# Patient Record
Sex: MALE | Race: OTHER | HISPANIC OR LATINO
[De-identification: names, ages, dates, MRNs, and addresses within clinical notes are randomized per-mention and may not be internally consistent; named-entity substitution may affect disease eponyms.]

---

## 2022-11-15 ENCOUNTER — APPOINTMENT (OUTPATIENT)
Dept: VASCULAR SURGERY | Facility: CLINIC | Age: 62
End: 2022-11-15

## 2022-12-14 ENCOUNTER — TRANSCRIPTION ENCOUNTER (OUTPATIENT)
Age: 62
End: 2022-12-14

## 2022-12-14 ENCOUNTER — APPOINTMENT (OUTPATIENT)
Dept: CARDIOLOGY | Facility: CLINIC | Age: 62
End: 2022-12-14

## 2022-12-14 VITALS
OXYGEN SATURATION: 97 % | SYSTOLIC BLOOD PRESSURE: 155 MMHG | DIASTOLIC BLOOD PRESSURE: 65 MMHG | WEIGHT: 230 LBS | HEART RATE: 99 BPM | HEIGHT: 69 IN | BODY MASS INDEX: 34.07 KG/M2

## 2022-12-14 DIAGNOSIS — Z82.49 FAMILY HISTORY OF ISCHEMIC HEART DISEASE AND OTHER DISEASES OF THE CIRCULATORY SYSTEM: ICD-10-CM

## 2022-12-14 DIAGNOSIS — Z81.8 FAMILY HISTORY OF OTHER MENTAL AND BEHAVIORAL DISORDERS: ICD-10-CM

## 2022-12-14 DIAGNOSIS — Z83.3 FAMILY HISTORY OF DIABETES MELLITUS: ICD-10-CM

## 2022-12-14 DIAGNOSIS — M19.90 UNSPECIFIED OSTEOARTHRITIS, UNSPECIFIED SITE: ICD-10-CM

## 2022-12-14 DIAGNOSIS — Z78.9 OTHER SPECIFIED HEALTH STATUS: ICD-10-CM

## 2022-12-14 PROCEDURE — 99204 OFFICE O/P NEW MOD 45 MIN: CPT

## 2022-12-14 RX ORDER — LOSARTAN POTASSIUM 100 MG/1
100 TABLET, FILM COATED ORAL DAILY
Qty: 90 | Refills: 3 | Status: ACTIVE | COMMUNITY

## 2022-12-14 RX ORDER — METFORMIN HYDROCHLORIDE 500 MG/1
500 TABLET, COATED ORAL
Refills: 0 | Status: ACTIVE | COMMUNITY

## 2022-12-14 NOTE — REASON FOR VISIT
[Consultation] : a consultation regarding [Peripheral Vascular Disease] : peripheral vascular disease [FreeTextEntry1] : 62 year old male with past medical history of HTN, HL, DM, arthritis, s/p multiple surgical procedure involving the right hip who presents for vascular medicine evaluation. \par \par He has had swelling in his legs since his hip surgeries (Right) and knee replacement (Left).  The right leg the swelling is localized to where his calf brace that he wears for foot drop). The left leg has swelling to the knee with skin color changes. No open wounds. He has limited mobility because of the foot drop, complains of some ramping/pain with walking. He had a recent stress test/angiography that he said was normal. He had his kidney checked out recently with no problems. \par \par The swelling does not involve the feet. The swelling is better with elevation and after being off his feet for a while. He does not currently use compression stockings.

## 2022-12-14 NOTE — PHYSICAL EXAM
[General Appearance - Well Developed] : well developed [Normal Appearance] : normal appearance [General Appearance - Well Nourished] : well nourished [Normal Conjunctiva] : the conjunctiva exhibited no abnormalities [Normal Oral Mucosa] : normal oral mucosa [Normal Oropharynx] : normal oropharynx [Normal Jugular Venous A Waves Present] : normal jugular venous A waves present [Normal Jugular Venous V Waves Present] : normal jugular venous V waves present [Heart Rate And Rhythm] : heart rate and rhythm were normal [Heart Sounds] : normal S1 and S2 [Murmurs] : no murmurs present [Respiration, Rhythm And Depth] : normal respiratory rhythm and effort [Auscultation Breath Sounds / Voice Sounds] : lungs were clear to auscultation bilaterally [Cyanosis, Localized] : no localized cyanosis [] : no ischemic changes [FreeTextEntry1] : hyperpigmentation of the left calf, thickened skin, all consistent with long standing edema  [Oriented To Time, Place, And Person] : oriented to person, place, and time [Impaired Insight] : insight and judgment were intact [Affect] : the affect was normal

## 2022-12-14 NOTE — REVIEW OF SYSTEMS
[Lower Ext Edema] : lower extremity edema [Joint Stiffness] : joint stiffness [Change In Color Of Skin] : change in skin color [Numbness (Hypoesthesia)] : numbness [Tingling (Paresthesia)] : tingling [Limb Weakness (Paresis)] : limb weakness (Paresis) [Negative] : Heme/Lymph

## 2022-12-14 NOTE — ASSESSMENT
[FreeTextEntry1] : #leg swelling: Multifactorial, likely venous insufficiency and lymphedema given multiple prior orthopedic interventions. \par \par -venous duplex with reflux studies, rule out DVT\par -trial of compression stockings\par \par #leg pain/cramps: \par -arterial duplex, ADIS given RFs and some pain with walking\par \par Follow-up once vascular testing is completed

## 2022-12-23 ENCOUNTER — OUTPATIENT (OUTPATIENT)
Dept: OUTPATIENT SERVICES | Facility: HOSPITAL | Age: 62
LOS: 1 days | End: 2022-12-23
Payer: MEDICARE

## 2022-12-23 ENCOUNTER — APPOINTMENT (OUTPATIENT)
Dept: ULTRASOUND IMAGING | Facility: CLINIC | Age: 62
End: 2022-12-23

## 2022-12-23 DIAGNOSIS — T84.010A BROKEN INTERNAL RIGHT HIP PROSTHESIS, INITIAL ENCOUNTER: Chronic | ICD-10-CM

## 2022-12-23 DIAGNOSIS — Z96.641 PRESENCE OF RIGHT ARTIFICIAL HIP JOINT: Chronic | ICD-10-CM

## 2022-12-23 DIAGNOSIS — M79.89 OTHER SPECIFIED SOFT TISSUE DISORDERS: ICD-10-CM

## 2022-12-23 PROCEDURE — 93970 EXTREMITY STUDY: CPT | Mod: 26

## 2022-12-23 PROCEDURE — 93925 LOWER EXTREMITY STUDY: CPT

## 2022-12-23 PROCEDURE — 93925 LOWER EXTREMITY STUDY: CPT | Mod: 26

## 2022-12-23 PROCEDURE — 93970 EXTREMITY STUDY: CPT

## 2023-01-04 ENCOUNTER — APPOINTMENT (OUTPATIENT)
Dept: CARDIOLOGY | Facility: CLINIC | Age: 63
End: 2023-01-04
Payer: MEDICARE

## 2023-01-04 VITALS
HEIGHT: 69 IN | HEART RATE: 107 BPM | SYSTOLIC BLOOD PRESSURE: 147 MMHG | WEIGHT: 230 LBS | BODY MASS INDEX: 34.07 KG/M2 | DIASTOLIC BLOOD PRESSURE: 85 MMHG | OXYGEN SATURATION: 97 %

## 2023-01-04 DIAGNOSIS — E11.9 TYPE 2 DIABETES MELLITUS W/OUT COMPLICATIONS: ICD-10-CM

## 2023-01-04 DIAGNOSIS — I10 ESSENTIAL (PRIMARY) HYPERTENSION: ICD-10-CM

## 2023-01-04 DIAGNOSIS — E78.5 HYPERLIPIDEMIA, UNSPECIFIED: ICD-10-CM

## 2023-01-04 DIAGNOSIS — M79.89 OTHER SPECIFIED SOFT TISSUE DISORDERS: ICD-10-CM

## 2023-01-04 DIAGNOSIS — M79.2 NEURALGIA AND NEURITIS, UNSPECIFIED: ICD-10-CM

## 2023-01-04 PROCEDURE — 99215 OFFICE O/P EST HI 40 MIN: CPT

## 2023-01-04 NOTE — ASSESSMENT
[FreeTextEntry1] : #leg swelling: Multifactorial, likely venous insufficiency and lymphedema given multiple prior orthopedic interventions. \par \par -continue compression stockings\par -no PAD or DVT on vascular studies\par -referral to neurology for neuropathic pain likely related to prior surgeries and exacerbated by diabetes\par -f/u prn

## 2023-01-04 NOTE — REASON FOR VISIT
[Follow-Up - Clinic] : a clinic follow-up of [Peripheral Vascular Disease] : peripheral vascular disease [FreeTextEntry1] : 62 year old male with past medical history of HTN, HL, DM, arthritis, s/p multiple surgical procedure involving the right hip who presents for vascular medicine follow-up.\par \par Venous duplex with no DVT.  Arterial duplex with no significant PAD. Feels well. Wants a referral for neuropathic pain in his right foot that has been present since his hip surgery in 2016 (re-re-do). No other changes since last visit.\par  \par Prior PMHx: \par He has had swelling in his legs since his hip surgeries (Right) and knee replacement (Left).  The right leg the swelling is localized to where his calf brace that he wears for foot drop). The left leg has swelling to the knee with skin color changes. No open wounds. He has limited mobility because of the foot drop, complains of some ramping/pain with walking. He had a recent stress test/angiography that he said was normal. He had his kidney checked out recently with no problems. \par \par The swelling does not involve the feet. The swelling is better with elevation and after being off his feet for a while. He does not currently use compression stockings.

## 2023-01-04 NOTE — PHYSICAL EXAM
[General Appearance - Well Developed] : well developed [Normal Appearance] : normal appearance [General Appearance - Well Nourished] : well nourished [Normal Conjunctiva] : the conjunctiva exhibited no abnormalities [Normal Oral Mucosa] : normal oral mucosa [Normal Oropharynx] : normal oropharynx [Normal Jugular Venous A Waves Present] : normal jugular venous A waves present [Normal Jugular Venous V Waves Present] : normal jugular venous V waves present [Respiration, Rhythm And Depth] : normal respiratory rhythm and effort [Auscultation Breath Sounds / Voice Sounds] : lungs were clear to auscultation bilaterally [Heart Rate And Rhythm] : heart rate and rhythm were normal [Heart Sounds] : normal S1 and S2 [Murmurs] : no murmurs present [Cyanosis, Localized] : no localized cyanosis [] : no ischemic changes [FreeTextEntry1] : hyperpigmentation of the left calf, thickened skin, all consistent with long standing edema  [Oriented To Time, Place, And Person] : oriented to person, place, and time [Impaired Insight] : insight and judgment were intact [Affect] : the affect was normal

## 2023-02-27 ENCOUNTER — OUTPATIENT (OUTPATIENT)
Dept: OUTPATIENT SERVICES | Facility: HOSPITAL | Age: 63
LOS: 1 days | End: 2023-02-27
Payer: MEDICARE

## 2023-02-27 VITALS
TEMPERATURE: 98 F | HEART RATE: 82 BPM | RESPIRATION RATE: 14 BRPM | DIASTOLIC BLOOD PRESSURE: 73 MMHG | OXYGEN SATURATION: 98 % | WEIGHT: 229.06 LBS | HEIGHT: 69 IN | SYSTOLIC BLOOD PRESSURE: 139 MMHG

## 2023-02-27 DIAGNOSIS — N32.89 OTHER SPECIFIED DISORDERS OF BLADDER: ICD-10-CM

## 2023-02-27 DIAGNOSIS — Z98.890 OTHER SPECIFIED POSTPROCEDURAL STATES: Chronic | ICD-10-CM

## 2023-02-27 DIAGNOSIS — F39 UNSPECIFIED MOOD [AFFECTIVE] DISORDER: ICD-10-CM

## 2023-02-27 DIAGNOSIS — R31.1 BENIGN ESSENTIAL MICROSCOPIC HEMATURIA: ICD-10-CM

## 2023-02-27 DIAGNOSIS — Z96.652 PRESENCE OF LEFT ARTIFICIAL KNEE JOINT: Chronic | ICD-10-CM

## 2023-02-27 DIAGNOSIS — Z96.641 PRESENCE OF RIGHT ARTIFICIAL HIP JOINT: Chronic | ICD-10-CM

## 2023-02-27 DIAGNOSIS — T84.010A BROKEN INTERNAL RIGHT HIP PROSTHESIS, INITIAL ENCOUNTER: Chronic | ICD-10-CM

## 2023-02-27 LAB
A1C WITH ESTIMATED AVERAGE GLUCOSE RESULT: 7.2 % — HIGH (ref 4–5.6)
ANION GAP SERPL CALC-SCNC: 16 MMOL/L — SIGNIFICANT CHANGE UP (ref 5–17)
BUN SERPL-MCNC: 14 MG/DL — SIGNIFICANT CHANGE UP (ref 7–23)
CALCIUM SERPL-MCNC: 9.6 MG/DL — SIGNIFICANT CHANGE UP (ref 8.4–10.5)
CHLORIDE SERPL-SCNC: 102 MMOL/L — SIGNIFICANT CHANGE UP (ref 96–108)
CO2 SERPL-SCNC: 22 MMOL/L — SIGNIFICANT CHANGE UP (ref 22–31)
CREAT SERPL-MCNC: 1.01 MG/DL — SIGNIFICANT CHANGE UP (ref 0.5–1.3)
EGFR: 85 ML/MIN/1.73M2 — SIGNIFICANT CHANGE UP
ESTIMATED AVERAGE GLUCOSE: 160 MG/DL — HIGH (ref 68–114)
GLUCOSE SERPL-MCNC: 174 MG/DL — HIGH (ref 70–99)
HCT VFR BLD CALC: 42.1 % — SIGNIFICANT CHANGE UP (ref 39–50)
HGB BLD-MCNC: 14.9 G/DL — SIGNIFICANT CHANGE UP (ref 13–17)
MCHC RBC-ENTMCNC: 29.4 PG — SIGNIFICANT CHANGE UP (ref 27–34)
MCHC RBC-ENTMCNC: 35.4 GM/DL — SIGNIFICANT CHANGE UP (ref 32–36)
MCV RBC AUTO: 83.2 FL — SIGNIFICANT CHANGE UP (ref 80–100)
NRBC # BLD: 0 /100 WBCS — SIGNIFICANT CHANGE UP (ref 0–0)
PLATELET # BLD AUTO: 150 K/UL — SIGNIFICANT CHANGE UP (ref 150–400)
POTASSIUM SERPL-MCNC: 3.7 MMOL/L — SIGNIFICANT CHANGE UP (ref 3.5–5.3)
POTASSIUM SERPL-SCNC: 3.7 MMOL/L — SIGNIFICANT CHANGE UP (ref 3.5–5.3)
RBC # BLD: 5.06 M/UL — SIGNIFICANT CHANGE UP (ref 4.2–5.8)
RBC # FLD: 13.9 % — SIGNIFICANT CHANGE UP (ref 10.3–14.5)
SODIUM SERPL-SCNC: 140 MMOL/L — SIGNIFICANT CHANGE UP (ref 135–145)
WBC # BLD: 4.2 K/UL — SIGNIFICANT CHANGE UP (ref 3.8–10.5)
WBC # FLD AUTO: 4.2 K/UL — SIGNIFICANT CHANGE UP (ref 3.8–10.5)

## 2023-02-27 PROCEDURE — 83036 HEMOGLOBIN GLYCOSYLATED A1C: CPT

## 2023-02-27 PROCEDURE — G0463: CPT

## 2023-02-27 PROCEDURE — 36415 COLL VENOUS BLD VENIPUNCTURE: CPT

## 2023-02-27 PROCEDURE — 85027 COMPLETE CBC AUTOMATED: CPT

## 2023-02-27 PROCEDURE — 80048 BASIC METABOLIC PNL TOTAL CA: CPT

## 2023-02-27 PROCEDURE — 87086 URINE CULTURE/COLONY COUNT: CPT

## 2023-02-27 NOTE — H&P PST ADULT - NSCAFFEAMTFREQ_GEN_ALL_CORE_SD
Reviewed to continue the prednisolone and diclofenac eye drops-- one drop 4/day until next eye visit 5-31-18    Pt states eye has been more comfortable    Note to dr. Angeles for review  Huey Christianson RN 8:05 AM 05/24/18       1-2 cups/cans per day

## 2023-02-27 NOTE — H&P PST ADULT - ALLERGIC REACTIONS
10/10/2022       RE: Mann Escobar  [address]  [facility] [id number]     Dear Colleague,    Thank you for referring your patient, Mann Escobar, to the [facility] at [facility]. Please see a copy of my visit note below.    Nephrology Progress Note  10/10/2022    Assessment and Plan:  82 year old male with history of CKD, hypertension, gastric bypass , GI bleeding , iron deficiency anemia and Thalassemia minor, who presents for CKD followup. He also has HFpEF and edema/ lymphedema. He has history of multiple orthopedic surgeries. His Scr is increased from 2 to 4 and significant anemia.    # CKD progressively worsening:  Scr has worsened especially in the last 2-3 years, previously was near 1 but up to 1.2-1.3 in  and 1.3-1.5 in  and up to 2 since .  His Scr in 2020 was noted up to 2, and had ankle surgery  and Scr remained around 2 since then.    - no NSAIDs in recent years except toradol 2020 postop   - no other significant nephrotoxins, on diuretics for many years - hydrochlorothiazide previously, on loop diuretics in recent years    - discussed lowering diuretic doses as able, he lowered to once a day but creatinine still 3.5 and has swelling   - he is wrapping legs / elevating and monitoring sodium more closely to see if we can lower diuretic dose, however given swelling can get significant and HFpEF, volume control is important.   - given history of gastric bypass, we checked oxalate level- it was elevated, started pyridoxine 50mg has been taking twice a day  - low grade proteinuria  - kidney biopsy 0n 22:   Kidney, needle biopsy: Limited biopsy sample showin) Arteriosclerosis, severe.  2) Extensive global       glomerulosclerosis, with extensive tubular atrophy and interstitial fibrosis.  - labs today  - referred to CKD journey. He completed kidney smart class  -  discussed possible need for dialysis in coming months. He agrees to be referred for PD catheter consult. .  # Hypertension: blood pressure ~120s systolic    -current regimen: amlodipine 5 mg daily, lasix 80/80 mg  - swelling still present but significantly improved    -continue furosemide     -  goal -135 systolic, and controlling swelling in multiple ways    # Anemia- acute on chronic, due to thalassemia and chronic renal disease, gastric ulcer noted recently:   - Previous Hgb 9.4, multifactorial, due to thalassemia and kidney disease, on EPO, sees hematology  - Iron studies: adequate.    # Electrolytes:   - Potassium; level: Normal / low normal, not taking potassium supplement.  - Bicarbonate; level: Normal/ high normal  - renal panel today    # Mineral Bone Disorder:   - Calcium; level low last check,   - Phosphorus; level is: Normal   - Vit D 37,   - started vit D 1000 units daily  - check calcium today, recheck Vit D and PTH in 3-6 months     Assessment and plan was discussed with patient and he voiced his understanding and agreement.    Disposition: Labs today, follow up with Dr. Alicea as planned.      Reason for Visit:  Mann Escobar is an 82 year old male with CKD from? Biopsy showed severe arteriosclerosis and extensive global glomerulosclerosis, with extensive tubular atrophy and interstitial fibrosis, HFpEF, who presents for followup.    HPI:  He is a pleasant male with history of CKD who follows up for progressively worsening renal function. He has had elevated creatinine over the past few years, which has fluctuated. He was noted with HFpEF/ moderate diastolic dysfunction.      He has had significant amount of swelling since his ankle surgery last year, but even going back years, he has dealt with swelling and was even in lymphedema clinic many years ago.    He was given furosemide 20 mg then 40mg which helped with the edema.  He lost now 40+ lbs of weight with furosemide  "and now takes it as needed to maintain his weight and was taking it as needed. He now takes 80 mg BID.   He had a knee replacement in 2000 and has had some edema and then after his ankle surgery in July 2020     His weight was up to 199 lbs from 183 lbs and had gotten down to near 160 lbs. And now between 145-150 on average.   He had gastric bypass in 2000 and was 280 lbs at that point.    He was taking NSAIDS in the past and had GI bleeding and noted with another ulcer last Fall  He was noted with hemoglobin down to 6.1 several months ago. Last check was in June 2021. He denies dark stools or evidence of bleeding  He was started on aranesp by hematology.    He has prostate enlargement and has been prescribed terazosin.  He has continued to take this in case it helps.  He states he has been having normal urination and denies change in appetite.   His Scr is up to 4 in January, most recent in June was 3.69. He has been eating more salt. He has not had a great appetite so eating more of the stuff he finds appetizing. He has been trying to hydrate well.     His weight change is significant over recent months. His oxalate was elevated when we checked it last year. He did start pyridoxine but only one pill a day.   His ultrasound did show several cysts bilaterally but otherwise unremarkable, with normal sizes though right kidney 10.5 and left 13cm.     He had a kidney biopsy on 6/13 which showed \"severe arteriosclerosis, extensive global       glomerulosclerosis, and extensive tubular atrophy and       interstitial fibrosis. The sample is limited in that the       tissue for immunofluorescence studies did not contain any       glomeruli. There is no evidence of oxalate nephropathy.\"   His SPEP was negative, UPEP had a small monoclonal band in the gamma region with peak of 1.1     Renal History:   Kidney Disease and Medical Hx:  h/o HTN: Yes      ROS:   A comprehensive review of systems was obtained and negative, except " as noted in the HPI or PMH.    Active Medical Problems:  Patient Active Problem List   Diagnosis      HX NEPHROLITHIASIS [V13.01]     Thalassemia minor     Esophageal reflux     Family history of prostate cancer     Leg edema     CARDIOVASCULAR SCREENING; LDL GOAL LESS THAN 130     Internal hemorrhoids     Iron deficiency anemia     First degree AV block     Family history of diabetes mellitus     Scoliosis     Hypopotassemia     Advanced directives, counseling/discussion     HTN (hypertension)     Benign non-nodular prostatic hyperplasia with lower urinary tract symptoms     Chronic low back pain     S/P knee replacement     S/P ankle fusion     Abnormal antinuclear antibody titer     Osteoarthritis     Hypertension     BPH (benign prostatic hyperplasia)     Pseudogout     Chronic pain syndrome     S/P ankle joint replacement     Arthritis of ankle, right     Chronic anemia     Chronic kidney disease, stage 3 (H)     Diastolic dysfunction     Carpal tunnel syndrome     Edema     Nephrolithiasis     Marginal ulcer     Retching     History of Favian-en-Y gastric bypass     Chronic heart failure with preserved ejection fraction (H)     Anemia of chronic renal failure, stage 3b (H)     Continuous opioid dependence (H)     PMH:   Medical record was reviewed and PMH was discussed with patient and noted below.  Past Medical History:   Diagnosis Date     Arthritis of ankle, left 7/24/2020     Back pain     WITH BILATERAL LEG PAIN AND NUMBNESS OF BOTH FEET     Gastro-oesophageal reflux disease     REFLUX     Hypertension      Hypopotassemia      Internal hemorrhoids      Iron deficiency anaemia      Leg edema      Morbid obesity 9/12/2005     Morbid obesity (H)      Osteoarthrosis      Scoliosis      Thalassemia minor      PSH:   Past Surgical History:   Procedure Laterality Date     CATARACT IOL, RT/LT  2008/    Cataract IOL RT/LT     COLONOSCOPY  2009/07    polyps removed repeat 5 yrs, tubular adenoma     COLONOSCOPY   9/29/2011    Procedure:COLONOSCOPY; Colonoscopy with hemorrhoid banding; Surgeon:JEREMY GARCIA; Location:[facility]     COLONOSCOPY N/A 12/19/2017    Procedure: COLONOSCOPY;  colonoscopy, gastroscopy;  Surgeon: Edmar Isaacs MD;  Location: [facility]     DECOMPRESSION LUMBAR MINIMALLY INVASIVE TWO LEVELS  5/2/2012    Procedure:DECOMPRESSION LUMBAR MINIMALLY INVASIVE TWO LEVELS; Surgeon:LOTTIE MITCHELL; [facility]     ESOPHAGOSCOPY, GASTROSCOPY, DUODENOSCOPY (EGD), COMBINED N/A 2/6/2018    Procedure: COMBINED ESOPHAGOSCOPY, GASTROSCOPY, DUODENOSCOPY (EGD);  gastroscopy;  Surgeon: Edmar Isaacs MD;  Location: [facility]     ESOPHAGOSCOPY, GASTROSCOPY, DUODENOSCOPY (EGD), COMBINED N/A 10/18/2021    Procedure: ESOPHAGOGASTRODUODENOSCOPY (EGD);  Surgeon: Anju Galeano MD;  Location: [facility]     FUSION SPINE POSTERIOR MINIMALLY INVASIVE ONE LEVEL  5/2/2012    Procedure:FUSION SPINE POSTERIOR MINIMALLY INVASIVE ONE LEVEL; Minimal Access Spinal Technology Transformaninal Lumbar Interbody Fusion L4-5, Decompression L3-5; Surgeon:LOTTIE MITCHELL; Location[facility]     HC REMOVAL OF HYDROCELE,TUNICA,UNILAT  2006    bilateral     IR RENAL BIOPSY LEFT  6/13/2022     ORTHOPEDIC SURGERY  2000    Lf knee replacement     ORTHOPEDIC SURGERY  2002, 2010    Rt replacement     ORTHOPEDIC SURGERY  2005    Left ankle fused     RECONSTRUCT ANKLE Right 7/23/2020    Procedure: Ankle RECONSTRUCTIve Arthroplasty;  Surgeon: uLke Powers DPM;  Location: [facility]     SURGICAL HISTORY OF -       Cysto     SURGICAL HISTORY OF -   2002    Percutaneous kidney stone removal     SURGICAL HISTORY OF -       ureteral stent removal     SURGICAL HISTORY OF -   2005    bariatric surgery-Favian-en-Y     SURGICAL HISTORY OF -   2008    carpal tunnel surgery rt wrist       Family Hx:   Family History   Problem Relation Age of Onset     Diabetes Brother      Obesity Brother      Cerebrovascular Disease Paternal Grandfather      Prostate Cancer Father       Cancer Father         bone     Diabetes Father      Heart Disease Mother         CHF     Cerebrovascular Disease Mother      Hypertension Mother      Cancer Mother         tumor in stomach     Cancer - colorectal Mother      Heart Disease Maternal Grandmother         CHF     Diabetes Paternal Grandmother      Breast Cancer Sister      Genitourinary Problems Son         Kidney stones     Cancer Brother      Cancer - colorectal Brother      Diabetes Brother      Personal Hx:   Social History     Tobacco Use     Smoking status: Former     Packs/day: 0.50     Years: 7.00     Pack years: 3.50     Types: Cigarettes     Quit date: 1962     Years since quittin.8     Smokeless tobacco: Never   Substance Use Topics     Alcohol use: Yes     Comment: one every other day. Rarely       Allergies:  Allergies   Allergen Reactions     Nkda [No Known Drug Allergies]        Medications:  Current Outpatient Medications   Medication Sig     amLODIPine (NORVASC) 5 MG tablet Take 1 tablet (5 mg) by mouth daily     fluticasone (FLONASE) 50 MCG/ACT nasal spray Spray 1 spray into both nostrils daily (Patient not taking: No sig reported)     folic acid 800 MCG tablet Take 1 tablet (800 mcg) by mouth daily     furosemide (LASIX) 40 MG tablet TAKE 2 TABLETS BY MOUTH 2 TIMES DAILY     HCA VITAMIN B12 500 MCG OR TABS 2 tablets daily     MULTIVITAMIN OR one daily     omeprazole (PRILOSEC) 40 MG DR capsule Take 1 capsule (40 mg) by mouth 2 times daily     OVER-THE-COUNTER Elderberry 50mg supplement, one daily     oxyCODONE (ROXICODONE) 5 MG tablet Take 1 tablet (5 mg) by mouth 2 times daily as needed for severe pain This is a 90 day supply     pyridOXINE (VITAMIN B-6) 50 MG tablet Take 1 tablet (50 mg) by mouth 2 times daily     sucralfate (CARAFATE) 1 GM tablet Take 1 tablet (1 g) by mouth 4 times daily Take 30 minutes before meals and at bedtime (Patient taking differently: Take 1 g by mouth 4 times daily Take 30 minutes before meals and  at bedtime    doesn't take this consistently)     terazosin (HYTRIN) 5 MG capsule TAKE 1 CAPSULE AT BEDTIME     Turmeric 500 MG CAPS Does not take all the time (Patient not taking: Reported on 8/29/2022)     VIACTIV OR With D 1000mg calcium     Vitamin D3 (CHOLECALCIFEROL) 25 mcg (1000 units) tablet Take 1 tablet (25 mcg) by mouth daily     zinc gluconate 50 MG tablet Take 50 mg by mouth daily     Current Facility-Administered Medications   Medication     ropivacaine (NAROPIN) injection 3 mL     ropivacaine (NAROPIN) injection 3 mL     triamcinolone (KENALOG-40) injection 40 mg     triamcinolone (KENALOG-40) injection 40 mg      Vitals:  There were no vitals taken for this visit.  GENERAL: Healthy, alert and no distress  Skin: skin changes noted to LE from chronic LE edema  Heart: RRR  Lungs: CTA  Extremities: 2+ edema to mid shin bilaterally L>R    LABS:   CMP  Recent Labs   Lab Test 08/29/22  1329 06/13/22  1304 03/24/22  1423 01/26/22  1303 01/21/22  1006 06/16/21  1435 02/08/21  1121 02/01/21  1148 01/25/21  1058    143 143  143 144   < > 143 142 141 142   POTASSIUM 3.6 4.3 3.7 3.7   < > 3.8 3.5 3.7 4.0   CHLORIDE 109 110* 110* 114*   < > 107 107 109 112*   CO2 24 24 26 25   < > 24 30 27 29   ANIONGAP 11 9 7 5   < > 12 5 5 1*   GLC 97 85 150* 128*   < > 80 91 117* 96   BUN 64* 54* 47* 54*   < > 47* 28 23 21   CR 4.22* 3.69* 3.49*  3.49* 4.03*   < > 2.73* 2.26* 2.12* 2.02*   GFRESTIMATED 13* 16* 17* 14*   < > 21* 26* 28* 30*   GFRESTBLACK  --   --   --   --   --  24* 31* 33* 35*   RUTH 6.0* 7.0* 6.9* 7.9*   < > 7.7* 8.0* 8.2* 8.4*    < > = values in this interval not displayed.     Recent Labs   Lab Test 02/08/21  1121 11/10/17  1401 06/11/15  1306   BILITOTAL 0.5 0.5 0.6   ALKPHOS 104 110 100   ALT 21 20 25   AST 14 13 14     CBC  Recent Labs   Lab Test 09/29/22  1241 09/08/22  1241 08/18/22  1321 07/28/22  1403 07/07/22  1323   HGB 9.4* 9.0* 8.9* 8.0* 8.5*   WBC 6.2 6.9  --  5.4 6.0   RBC 5.07 4.75  --   4.32* 4.56   HCT 30.5* 28.6* 29.0* 26.2* 28.0*   MCV 60* 60*  --  61* 61*   MCH 18.5* 18.9*  --  18.5* 18.6*   MCHC 30.8* 31.5  --  30.5* 30.4*   RDW 18.4* 18.6*  --  19.4* 19.5*    195  --  210 189     URINE STUDIES  Recent Labs   Lab Test 03/24/22  1538 01/26/22  1303 06/16/21  1444 11/30/18  1045 09/28/15  1134   COLOR Yellow Yellow Yellow Yellow Yellow   APPEARANCE Clear Slightly Cloudy* Clear Clear Clear   URINEGLC 50* Negative Negative Negative Negative   URINEBILI Negative Negative Negative Negative Small  This is an unconfirmed screening test result. A positive result may be false.  *   URINEKETONE Negative Negative Negative Negative Trace*   SG 1.016 1.016 1.020 1.025 >1.030   UBLD Negative Negative Negative Trace* Negative   URINEPH 5.0 5.0 5.5 6.0 5.5   PROTEIN 100* 100* 100* >=300* 100*   UROBILINOGEN  --   --  1.0 1.0 1.0   NITRITE Negative Negative Negative Negative Negative   LEUKEST Negative Negative Negative Negative Negative   RBCU 0 <1 O - 2 O - 2 O - 2  Urine was tested unconcentrated because <10 ml was received.     WBCU 1 3 0 - 5 0 - 5 O - 2  Urine was tested unconcentrated because <10 ml was received.       Recent Labs   Lab Test 03/24/22  1538 01/26/22  1303   UTPG 1.88* 0.80*     PTH  Recent Labs   Lab Test 08/29/22  1329   PTHI 356*     IRON STUDIES  Recent Labs   Lab Test 09/29/22  1241 07/28/22  1322 05/26/22  1257   IRON 48* 57 33*   * 144* 176*   IRONSAT 32 40 19   TESSIE 323 242 288         Stormy Madrigal PA-C    Visit length 20minutes. An additional 20 minutes were spent on date of service in chart review, documentation, and other activities as noted.        none

## 2023-02-27 NOTE — H&P PST ADULT - PROBLEM SELECTOR PLAN 1
Cystoscopy, Transurethral Resection of Bladder Tumor, Bladder Biopsy  -cbc, bmp, A1c, urine culture at [facility]  -medical evaluation scheduled march 1st  -preop instructions provided  -instructed to hold metformin 24 hours prior to surgery  -fingerstick on admit

## 2023-02-27 NOTE — H&P PST ADULT - MUSCULOSKELETAL
ROM intact/no joint erythema/no joint warmth/no calf tenderness/normal gait/strength 5/5 bilateral upper extremities/strength 5/5 bilateral lower extremities/abnormal gait details…

## 2023-02-27 NOTE — H&P PST ADULT - NSICDXFAMILYHX_GEN_ALL_CORE_FT
FAMILY HISTORY:  Father  Still living? Unknown  Family history of heart disease, Age at diagnosis: Age Unknown    Mother  Still living? Unknown  Family history of heart disease, Age at diagnosis: Age Unknown

## 2023-02-27 NOTE — H&P PST ADULT - NSICDXPASTMEDICALHX_GEN_ALL_CORE_FT
PAST MEDICAL HISTORY:  Blood transfusion reaction s/p blood transfusion post-hip replacement    Foot drop, right     GERD (gastroesophageal reflux disease)     HLD (hyperlipidemia)     HTN (hypertension)     PVD (peripheral vascular disease)     T2DM (type 2 diabetes mellitus)

## 2023-02-27 NOTE — H&P PST ADULT - NSICDXPASTSURGICALHX_GEN_ALL_CORE_FT
PAST SURGICAL HISTORY:  Broken internal right hip prosthesis s/p removal of R hip prosthesis due to infection in 2014 requiring long term ABx    H/O colonoscopy     H/O total knee replacement, left     History of hip replacement, right     History of revision of total replacement of right hip joint

## 2023-02-27 NOTE — H&P PST ADULT - HISTORY OF PRESENT ILLNESS
62 year old male with past medical history of HTN, T2DM, Osteoarthritis, S/P Left Total Knee Replacement, Right Hip Replacement, Right Hip Revision surgery x 3, Right Foot Drop (wears a right calf brace) with complaint of bladder tumor. Patient endorses gross hematuria and UTI in October 2022, he underwent cystoscopy in february 2023 and was found to  have bladder tumor. He currently denies fever, chills, chills, gross hematuria, dysuria, nocturia, urinary incontinence. He presents today prior to scheduled Cystoscopy, Transurethral Resection of Bladder Tumor, Bladder Biopsy on 3/8/2023.    preop covid swab 3/5 @ [facility]

## 2023-02-28 LAB
CULTURE RESULTS: NO GROWTH — SIGNIFICANT CHANGE UP
SPECIMEN SOURCE: SIGNIFICANT CHANGE UP

## 2023-03-05 ENCOUNTER — OUTPATIENT (OUTPATIENT)
Dept: OUTPATIENT SERVICES | Facility: HOSPITAL | Age: 63
LOS: 1 days | End: 2023-03-05
Payer: MEDICARE

## 2023-03-05 DIAGNOSIS — T84.010A BROKEN INTERNAL RIGHT HIP PROSTHESIS, INITIAL ENCOUNTER: Chronic | ICD-10-CM

## 2023-03-05 DIAGNOSIS — Z11.52 ENCOUNTER FOR SCREENING FOR COVID-19: ICD-10-CM

## 2023-03-05 DIAGNOSIS — Z96.641 PRESENCE OF RIGHT ARTIFICIAL HIP JOINT: Chronic | ICD-10-CM

## 2023-03-05 DIAGNOSIS — Z98.890 OTHER SPECIFIED POSTPROCEDURAL STATES: Chronic | ICD-10-CM

## 2023-03-05 DIAGNOSIS — Z96.652 PRESENCE OF LEFT ARTIFICIAL KNEE JOINT: Chronic | ICD-10-CM

## 2023-03-05 PROCEDURE — U0003: CPT

## 2023-03-05 PROCEDURE — U0005: CPT

## 2023-03-05 PROCEDURE — C9803: CPT

## 2023-03-06 LAB — SARS-COV-2 RNA SPEC QL NAA+PROBE: SIGNIFICANT CHANGE UP

## 2023-03-07 ENCOUNTER — TRANSCRIPTION ENCOUNTER (OUTPATIENT)
Age: 63
End: 2023-03-07

## 2023-03-08 ENCOUNTER — OUTPATIENT (OUTPATIENT)
Dept: OUTPATIENT SERVICES | Facility: HOSPITAL | Age: 63
LOS: 1 days | End: 2023-03-08
Payer: MEDICARE

## 2023-03-08 ENCOUNTER — TRANSCRIPTION ENCOUNTER (OUTPATIENT)
Age: 63
End: 2023-03-08

## 2023-03-08 VITALS
HEIGHT: 69 IN | DIASTOLIC BLOOD PRESSURE: 89 MMHG | SYSTOLIC BLOOD PRESSURE: 179 MMHG | RESPIRATION RATE: 18 BRPM | TEMPERATURE: 98 F | OXYGEN SATURATION: 96 % | WEIGHT: 229.06 LBS | HEART RATE: 91 BPM

## 2023-03-08 VITALS
DIASTOLIC BLOOD PRESSURE: 76 MMHG | RESPIRATION RATE: 17 BRPM | HEART RATE: 85 BPM | OXYGEN SATURATION: 97 % | SYSTOLIC BLOOD PRESSURE: 130 MMHG

## 2023-03-08 DIAGNOSIS — Z96.641 PRESENCE OF RIGHT ARTIFICIAL HIP JOINT: Chronic | ICD-10-CM

## 2023-03-08 DIAGNOSIS — Z98.890 OTHER SPECIFIED POSTPROCEDURAL STATES: Chronic | ICD-10-CM

## 2023-03-08 DIAGNOSIS — T84.010A BROKEN INTERNAL RIGHT HIP PROSTHESIS, INITIAL ENCOUNTER: Chronic | ICD-10-CM

## 2023-03-08 DIAGNOSIS — Z96.652 PRESENCE OF LEFT ARTIFICIAL KNEE JOINT: Chronic | ICD-10-CM

## 2023-03-08 DIAGNOSIS — N32.89 OTHER SPECIFIED DISORDERS OF BLADDER: ICD-10-CM

## 2023-03-08 DIAGNOSIS — R31.1 BENIGN ESSENTIAL MICROSCOPIC HEMATURIA: ICD-10-CM

## 2023-03-08 LAB
GLUCOSE BLDC GLUCOMTR-MCNC: 160 MG/DL — HIGH (ref 70–99)
GLUCOSE BLDC GLUCOMTR-MCNC: 196 MG/DL — HIGH (ref 70–99)

## 2023-03-08 PROCEDURE — 52234 CYSTOSCOPY AND TREATMENT: CPT

## 2023-03-08 PROCEDURE — 88305 TISSUE EXAM BY PATHOLOGIST: CPT | Mod: 26

## 2023-03-08 PROCEDURE — 82962 GLUCOSE BLOOD TEST: CPT

## 2023-03-08 PROCEDURE — 88305 TISSUE EXAM BY PATHOLOGIST: CPT

## 2023-03-08 PROCEDURE — C9399: CPT

## 2023-03-08 RX ORDER — SODIUM CHLORIDE 9 MG/ML
3 INJECTION INTRAMUSCULAR; INTRAVENOUS; SUBCUTANEOUS EVERY 8 HOURS
Refills: 0 | Status: DISCONTINUED | OUTPATIENT
Start: 2023-03-08 | End: 2023-03-08

## 2023-03-08 RX ORDER — CEPHALEXIN 500 MG
1 CAPSULE ORAL
Qty: 3 | Refills: 0
Start: 2023-03-08 | End: 2023-03-08

## 2023-03-08 RX ORDER — LIDOCAINE HCL 20 MG/ML
0.2 VIAL (ML) INJECTION ONCE
Refills: 0 | Status: DISCONTINUED | OUTPATIENT
Start: 2023-03-08 | End: 2023-03-08

## 2023-03-08 RX ORDER — ONDANSETRON 8 MG/1
4 TABLET, FILM COATED ORAL ONCE
Refills: 0 | Status: DISCONTINUED | OUTPATIENT
Start: 2023-03-08 | End: 2023-03-08

## 2023-03-08 RX ORDER — CEFAZOLIN SODIUM 1 G
2000 VIAL (EA) INJECTION ONCE
Refills: 0 | Status: COMPLETED | OUTPATIENT
Start: 2023-03-08 | End: 2023-03-08

## 2023-03-08 RX ORDER — HYDROMORPHONE HYDROCHLORIDE 2 MG/ML
0.5 INJECTION INTRAMUSCULAR; INTRAVENOUS; SUBCUTANEOUS
Refills: 0 | Status: DISCONTINUED | OUTPATIENT
Start: 2023-03-08 | End: 2023-03-08

## 2023-03-08 RX ORDER — ACETAMINOPHEN 500 MG
1000 TABLET ORAL ONCE
Refills: 0 | Status: DISCONTINUED | OUTPATIENT
Start: 2023-03-08 | End: 2023-03-22

## 2023-03-08 NOTE — PRE-ANESTHESIA EVALUATION ADULT - NSANTHADDINFOFT_GEN_ALL_CORE
Patient has occasional symptoms from GERD. Will proceed with GETA. Patient has occasional symptoms from GERD. Will proceed with GETA. Medical clearance Dr. SHIRLEY Soni read and appreciated.

## 2023-03-08 NOTE — ASU DISCHARGE PLAN (ADULT/PEDIATRIC) - ASU DC SPECIAL INSTRUCTIONSFT
CATHETER: Some patients are sent home with a tellez catheter, while others go home urinating on their own. If you still have a catheter, the nurses will review instructions and care before you go home.   GENERAL: It is common to have blood in your urine after your procedure. It may be pink or even red; inform your doctor if you have a significant amount of clot in the urine or if you are unable to void at all or if your catheter stops draining. The urine may clear and then become bloody again especially as you are more physically active. It is not uncommon to have some burning when you urinate, this will gradually improve. With a catheter in place, it is not uncommon to have occasional leakage or urine or blood around the catheter. Please call your urologist if this is excessive and/or the urine is not draining through the catheter into the bag.  BATHING: You may shower or bathe once catheter is removed tomorrow in the office.   DIET: You may resume your regular diet and regular medication regimen.  PAIN: You may take Tylenol (acetaminophen) 650-975mg and/or Motrin (ibuprofen) 400-600mg, both available over the counter, for pain every 6 hours as needed. Do not exceed 4000mg of Tylenol (acetaminophen) daily. You may alternate these medications such that you take one or the other every 3 hours for around the clock pain coverage.  ANTIBIOTICS: You may be given a prescription for an antibiotic, please take this medication as instructed and be sure to complete the entire course.  STOOL SOFTENERS: Do not allow yourself to become constipated as straining may cause bleeding. Take stool softeners or a laxative (ex. Miralax, Colace, Senokot, ExLax, etc), available over the counter, if needed.  ACTIVITY: No heavy lifting or strenuous exercise until you are evaluated at your post-operative appointment. Otherwise, you may return to your usual level of physical activity.  ANTICOAGULATION: If you are taking any blood thinning medications, please discuss with your urologist prior to restarting these medications unless otherwise specified.  FOLLOW-UP: If you did not already schedule your post-operative appointment, please call your urologist to schedule and follow-up appointment. Plan to see Dr Degroot in the office tomorrow, 3/9 for catheter removal.   CALL YOUR UROLOGIST IF: You have any bleeding that does not stop, inability to void >8 hours, fever over 100.4 F, chills, persistent nausea/vomiting, changes in your incision concerning for infection, or if your pain is not controlled on your discharge pain medications.

## 2023-03-08 NOTE — ASU PATIENT PROFILE, ADULT - FALL HARM RISK - HARM RISK INTERVENTIONS
Communicate Risk of Fall with Harm to all staff/Reinforce activity limits and safety measures with patient and family/Tailored Fall Risk Interventions/Visual Cue: Yellow wristband and red socks/Bed in lowest position, wheels locked, appropriate side rails in place/Call bell, personal items and telephone in reach/Instruct patient to call for assistance before getting out of bed or chair/Non-slip footwear when patient is out of bed/[place] to call system/Physically safe environment - no spills, clutter or unnecessary equipment/Purposeful Proactive Rounding/Room/bathroom lighting operational, light cord in reach Assistance OOB with selected safe patient handling equipment/Communicate Risk of Fall with Harm to all staff/Discuss with provider need for PT consult/Monitor gait and stability/Provide patient with walking aids - walker, cane, crutches/Reinforce activity limits and safety measures with patient and family/Tailored Fall Risk Interventions/Visual Cue: Yellow wristband and red socks/Bed in lowest position, wheels locked, appropriate side rails in place/Call bell, personal items and telephone in reach/Instruct patient to call for assistance before getting out of bed or chair/Non-slip footwear when patient is out of bed/[place] to call system/Physically safe environment - no spills, clutter or unnecessary equipment/Purposeful Proactive Rounding/Room/bathroom lighting operational, light cord in reach

## 2023-03-08 NOTE — ASU DISCHARGE PLAN (ADULT/PEDIATRIC) - NS MD DC FALL RISK RISK
For information on Fall & Injury Prevention, visit: [email or web address] OR  [email or web address] OR  https://www.cdc.gov/steadi/patient.html

## 2023-03-08 NOTE — ASU DISCHARGE PLAN (ADULT/PEDIATRIC) - CARE PROVIDER_API CALL
Juan Degroot)  Urology  [address] [place] [address]  Phone: [phone]  Fax: [phone]  Scheduled Appointment: 03/09/2023

## 2023-03-16 LAB — SURGICAL PATHOLOGY STUDY: SIGNIFICANT CHANGE UP

## 2023-04-27 ENCOUNTER — APPOINTMENT (OUTPATIENT)
Dept: NEUROLOGY | Facility: HOSPITAL | Age: 63
End: 2023-04-27

## 2023-04-27 ENCOUNTER — OUTPATIENT (OUTPATIENT)
Dept: OUTPATIENT SERVICES | Facility: HOSPITAL | Age: 63
LOS: 1 days | End: 2023-04-27
Payer: MEDICARE

## 2023-04-27 VITALS
SYSTOLIC BLOOD PRESSURE: 146 MMHG | HEART RATE: 70 BPM | BODY MASS INDEX: 34.36 KG/M2 | DIASTOLIC BLOOD PRESSURE: 82 MMHG | TEMPERATURE: 98 F | WEIGHT: 232 LBS | HEIGHT: 69 IN

## 2023-04-27 DIAGNOSIS — Z80.42 FAMILY HISTORY OF MALIGNANT NEOPLASM OF PROSTATE: ICD-10-CM

## 2023-04-27 DIAGNOSIS — Z96.641 PRESENCE OF RIGHT ARTIFICIAL HIP JOINT: Chronic | ICD-10-CM

## 2023-04-27 DIAGNOSIS — R51.9 HEADACHE, UNSPECIFIED: ICD-10-CM

## 2023-04-27 DIAGNOSIS — M79.2 NEURALGIA AND NEURITIS, UNSPECIFIED: ICD-10-CM

## 2023-04-27 DIAGNOSIS — Z80.52 FAMILY HISTORY OF MALIGNANT NEOPLASM OF BLADDER: ICD-10-CM

## 2023-04-27 DIAGNOSIS — Z98.890 OTHER SPECIFIED POSTPROCEDURAL STATES: Chronic | ICD-10-CM

## 2023-04-27 DIAGNOSIS — Z82.49 FAMILY HISTORY OF ISCHEMIC HEART DISEASE AND OTHER DISEASES OF THE CIRCULATORY SYSTEM: ICD-10-CM

## 2023-04-27 DIAGNOSIS — Z96.652 PRESENCE OF LEFT ARTIFICIAL KNEE JOINT: Chronic | ICD-10-CM

## 2023-04-27 DIAGNOSIS — T84.010A BROKEN INTERNAL RIGHT HIP PROSTHESIS, INITIAL ENCOUNTER: Chronic | ICD-10-CM

## 2023-04-27 PROBLEM — I10 ESSENTIAL (PRIMARY) HYPERTENSION: Chronic | Status: ACTIVE | Noted: 2023-02-27

## 2023-04-27 PROBLEM — I73.9 PERIPHERAL VASCULAR DISEASE, UNSPECIFIED: Chronic | Status: ACTIVE | Noted: 2023-02-27

## 2023-04-27 PROBLEM — E11.9 TYPE 2 DIABETES MELLITUS WITHOUT COMPLICATIONS: Chronic | Status: ACTIVE | Noted: 2023-02-27

## 2023-04-27 PROBLEM — E78.5 HYPERLIPIDEMIA, UNSPECIFIED: Chronic | Status: ACTIVE | Noted: 2023-02-27

## 2023-04-27 PROCEDURE — G0463: CPT

## 2023-04-27 RX ORDER — LIDOCAINE 4% 4 G/100G
4 CREAM TOPICAL
Qty: 1 | Refills: 0 | Status: ACTIVE | COMMUNITY
Start: 2023-04-27 | End: 1900-01-01
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
## 2023-04-27 NOTE — REVIEW OF SYSTEMS
[Leg Weakness] : leg weakness [Numbness] : numbness [Abnormal Sensation] : an abnormal sensation [Tension Headache] : tension-type headaches [Difficulty Walking] : difficulty walking [Limping] : limping [Eyesight Problems] : eyesight problems [Lower Ext Edema] : lower extremity edema [Abdominal Pain] : abdominal pain [Joint Pain] : joint pain [Limb Pain] : limb pain [Limb Swelling] : limb swelling [Negative] : Respiratory [Facial Weakness] : no facial weakness [Arm Weakness] : no arm weakness [Hand Weakness] : no hand weakness [FreeTextEntry3] : diabetic retinopathy [FreeTextEntry7] : occasional epigastric pain [FreeTextEntry9] : intermittent pain of b/l elbows, swelling of b/l calves
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
## 2023-04-27 NOTE — PHYSICAL EXAM
[General Appearance - Alert] : alert [General Appearance - In No Acute Distress] : in no acute distress [General Appearance - Well Nourished] : well nourished [General Appearance - Well Developed] : well developed [General Appearance - Well-Appearing] : healthy appearing [Oriented To Time, Place, And Person] : oriented to person, place, and time [Impaired Insight] : insight and judgment were intact [Affect] : the affect was normal [Mood] : the mood was normal [Memory Recent] : recent memory was not impaired [Memory Remote] : remote memory was not impaired [Person] : oriented to person [Place] : oriented to place [Time] : oriented to time [Short Term Intact] : short term memory intact [Remote Intact] : remote memory intact [Registration Intact] : recent registration memory intact [Span Intact] : the attention span was normal [Concentration Intact] : normal concentrating ability [Visual Intact] : visual attention was ~T not ~L decreased [Fluency] : fluency intact [Comprehension] : comprehension intact [Cranial Nerves Optic (II)] : visual acuity intact bilaterally,  visual fields full to confrontation, pupils equal round and reactive to light [Cranial Nerves Oculomotor (III)] : extraocular motion intact [Cranial Nerves Trigeminal (V)] : facial sensation intact symmetrically [Cranial Nerves Facial (VII)] : face symmetrical [Cranial Nerves Vestibulocochlear (VIII)] : hearing was intact bilaterally [Cranial Nerves Glossopharyngeal (IX)] : tongue and palate midline [Cranial Nerves Accessory (XI - Cranial And Spinal)] : head turning and shoulder shrug symmetric [Cranial Nerves Hypoglossal (XII)] : there was no tongue deviation with protrusion [Involuntary Movements] : no involuntary movements were seen [4] : knee extension 4/5 [5] : great toe flexion 5/5 [Balance] : balance was intact [0] : Ankle jerk right 0 [1+] : Ankle jerk left 1+ [Sclera] : the sclera and conjunctiva were normal [PERRL With Normal Accommodation] : pupils were equal in size, round, reactive to light, with normal accommodation [Extraocular Movements] : extraocular movements were intact [No APD] : no afferent pupillary defect [Full Visual Field] : full visual field [No CARLITO] : no internuclear ophthalmoplegia [Outer Ear] : the ears and nose were normal in appearance [Hearing Threshold Finger Rub Not [place]] : hearing was normal [Examination Of The Oral Cavity] : the lips and gums were normal [Neck Appearance] : the appearance of the neck was normal [Oropharynx] : the oropharynx was normal [Neck Cervical Mass (___cm)] : no neck mass was observed [] : no respiratory distress [Respiration, Rhythm And Depth] : normal respiratory rhythm and effort [Exaggerated Use Of Accessory Muscles For Inspiration] : no accessory muscle use [Tactile Decrease Entire Leg Right] : diminished right leg [Tactile Decrease Entire Leg Left] : diminished left leg [Tactile Decrease Lower Medial Thigh And Knee - Right Only] : diminished right lower medial thigh and knee [Tactile Decrease Lower Medial Thigh And Knee - Left Only] : diminished left lower medial thigh and knee [Tactile Decrease Superficial Peroneal Nerve Right Leg] : diminished right dorsum of the foot [Paresis Pronator Drift Right-Sided] : no pronator drift on the right [Paresis Pronator Drift Left-Sided] : no pronator drift on the left [Motor Strength Upper Extremities Bilaterally] : strength was normal in both upper extremities [Tactile Decrease Shoulders Right] : normal right shoulder [Tactile Decrease Shoulders Left] : normal left shoulder [Tactile Decrease Entire Right Arm] : normal right arm [Tactile Decrease Entire Left Arm] : normal left arm [Tactile Decrease Entire Right Side Of Face] : normal right side of the face [Tactile Decrease Entire Left Side Of Face] : normal left side of the face [Tactile Decrease Hand] : normal left hand [Tactile Decrease Neck Right Side] : normal right side of the neck [Tactile Decrease Neck Left Side] : normal left side of the neck [Tactile Decrease Lateral Upper Thigh - Left Only] : normal left lateral upper thigh [Tactile Decrease Lateral Upper Thigh - Right Only] : normal right lateral upper thigh [Tactile Decrease Superficial Peroneal Nerve Left Leg] : normal left dorsum of the foot [Past-pointing] : there was no past-pointing [Tremor] : no tremor present [Coordination - Dysmetria Impaired Finger-to-Nose Bilateral] : not present [Plantar Reflex Right Only] : normal on the right [Plantar Reflex Left Only] : normal on the left [___] : absent on the right [___] : absent on the left [FreeTextEntry6] : normal tone in b/l upper extremities. Hypotonia in RLE. Normal tone in LLE. Atrophy of R calf. [FreeTextEntry7] : dorsal/plantar aspect of R foot and toes; diminished sensation in R calf, intact sensation in R thigh, intact in b/l upper extremities, intact in L thigh, diminished in L calf (but stronger compared to R) [FreeTextEntry8] : walking unassisted w/ cane, externally rotated when ambulating w/o cane

## 2023-06-26 ENCOUNTER — OUTPATIENT (OUTPATIENT)
Dept: OUTPATIENT SERVICES | Facility: HOSPITAL | Age: 63
LOS: 1 days | End: 2023-06-26
Payer: MEDICARE

## 2023-06-26 VITALS
HEIGHT: 69 IN | TEMPERATURE: 97 F | WEIGHT: 229.06 LBS | OXYGEN SATURATION: 96 % | RESPIRATION RATE: 17 BRPM | HEART RATE: 96 BPM | SYSTOLIC BLOOD PRESSURE: 133 MMHG | DIASTOLIC BLOOD PRESSURE: 78 MMHG

## 2023-06-26 DIAGNOSIS — Z01.818 ENCOUNTER FOR OTHER PREPROCEDURAL EXAMINATION: ICD-10-CM

## 2023-06-26 DIAGNOSIS — E11.9 TYPE 2 DIABETES MELLITUS WITHOUT COMPLICATIONS: ICD-10-CM

## 2023-06-26 DIAGNOSIS — Z96.652 PRESENCE OF LEFT ARTIFICIAL KNEE JOINT: Chronic | ICD-10-CM

## 2023-06-26 DIAGNOSIS — C67.2 MALIGNANT NEOPLASM OF LATERAL WALL OF BLADDER: ICD-10-CM

## 2023-06-26 DIAGNOSIS — Z96.641 PRESENCE OF RIGHT ARTIFICIAL HIP JOINT: Chronic | ICD-10-CM

## 2023-06-26 DIAGNOSIS — R26.81 UNSTEADINESS ON FEET: ICD-10-CM

## 2023-06-26 DIAGNOSIS — T84.010A BROKEN INTERNAL RIGHT HIP PROSTHESIS, INITIAL ENCOUNTER: Chronic | ICD-10-CM

## 2023-06-26 DIAGNOSIS — C67.9 MALIGNANT NEOPLASM OF BLADDER, UNSPECIFIED: ICD-10-CM

## 2023-06-26 DIAGNOSIS — Z98.890 OTHER SPECIFIED POSTPROCEDURAL STATES: Chronic | ICD-10-CM

## 2023-06-26 PROCEDURE — 83036 HEMOGLOBIN GLYCOSYLATED A1C: CPT

## 2023-06-26 PROCEDURE — G0463: CPT

## 2023-06-26 PROCEDURE — 87086 URINE CULTURE/COLONY COUNT: CPT

## 2023-06-26 PROCEDURE — 80048 BASIC METABOLIC PNL TOTAL CA: CPT

## 2023-06-26 PROCEDURE — 85027 COMPLETE CBC AUTOMATED: CPT

## 2023-06-26 NOTE — H&P PST ADULT - CARDIOVASCULAR
negative normal/regular rate and rhythm/S1 S2 present/no gallops/no rub/no murmur/peripheral edema/vascular

## 2023-06-26 NOTE — H&P PST ADULT - NSICDXPROCEDURE_GEN_ALL_CORE_FT
PROCEDURES:  Transurethral resection of bladder tumor (TURBT) with biopsy 26-Jun-2023 15:07:46  Halley Wells

## 2023-06-26 NOTE — H&P PST ADULT - ASSESSMENT
DASI: walk with cane uneven gait Mets 6   Symptoms : Denies SOB, BALL, palpitations  Airway : no airway abnormalities , denies prior anesthesia complications   Mallampati : 3  missing several teeth. teeth that are present do not wiggle or move     Corneal abrasion risk : Denies   CAPRINI SCORE [CLOT]    AGE RELATED RISK FACTORS                                                       MOBILITY RELATED FACTORS  [ ] Age 41-60 years                                            (1 Point)                  [ ] Bed rest                                                        (1 Point)  [x ] Age: 61-74 years                                           (2 Points)                 [ ] Plaster cast                                                   (2 Points)  [ ] Age= 75 years                                              (3 Points)                 [ ] Bed bound for more than 72 hours                 (2 Points)    DISEASE RELATED RISK FACTORS                                               GENDER SPECIFIC FACTORS  [x ] Edema in the lower extremities                       (1 Point)                  [ ] Pregnancy                                                     (1 Point)  [ ] Varicose veins                                               (1 Point)                  [ ] Post-partum < 6 weeks                                   (1 Point)             [ x] BMI > 25 Kg/m2                                            (1 Point)                  [ ] Hormonal therapy  or oral contraception          (1 Point)                 [ ] Sepsis (in the previous month)                        (1 Point)                  [ ] History of pregnancy complications                 (1 point)  [ ] Pneumonia or serious lung disease                                               [ ] Unexplained or recurrent                     (1 Point)           (in the previous month)                               (1 Point)  [ ] Abnormal pulmonary function test                     (1 Point)                 SURGERY RELATED RISK FACTORS  [ ] Acute myocardial infarction                              (1 Point)                 [ ]  Section                                             (1 Point)  [ ] Congestive heart failure (in the previous month)  (1 Point)               [ ] Minor surgery                                                  (1 Point)   [ ] Inflammatory bowel disease                             (1 Point)                 [ ] Arthroscopic surgery                                        (2 Points)  [ ] Central venous access                                      (2 Points)                [x ] General surgery lasting more than 45 minutes   (2 Points)       [ ] Stroke (in the previous month)                          (5 Points)               [ ] Elective arthroplasty                                         (5 Points)                                                                                                                                               HEMATOLOGY RELATED FACTORS                                                 TRAUMA RELATED RISK FACTORS  [ ] Prior episodes of VTE                                     (3 Points)                [ ] Fracture of the hip, pelvis, or leg                       (5 Points)  [ ] Positive family history for VTE                         (3 Points)                 [ ] Acute spinal cord injury (in the previous month)  (5 Points)  [ ] Prothrombin 24321 A                                     (3 Points)                 [ ] Paralysis  (less than 1 month)                             (5 Points)  [ ] Factor V Leiden                                             (3 Points)                  [ ] Multiple Trauma within 1 month                        (5 Points)  [ ] Lupus anticoagulants                                     (3 Points)                                                           [ ] Anticardiolipin antibodies                               (3 Points)                                                       [ ] High homocysteine in the blood                      (3 Points)                                             [ ] Other congenital or acquired thrombophilia      (3 Points)                                                [ ] Heparin induced thrombocytopenia                  (3 Points)                                          Total Score [     6     ]    Caprini Score 0 - 2:  Low Risk, No VTE Prophylaxis required for most patients, encourage ambulation  Caprini Score 3 - 6:  At Risk, pharmacologic VTE prophylaxis is indicated for most patients (in the absence of a contraindication)  Caprini Score Greater than or = 7:  High Risk, pharmacologic VTE prophylaxis is indicated for most patients (in the absence of a contraindication)
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
## 2023-06-26 NOTE — H&P PST ADULT - MUSCULOSKELETAL
details… decreased ROM due to pain/strength 5/5 bilateral upper extremities/back exam/extremities exam/decreased strength/abnormal gait
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
## 2023-07-27 PROBLEM — Z87.898 PERSONAL HISTORY OF OTHER SPECIFIED CONDITIONS: Chronic | Status: ACTIVE | Noted: 2023-06-26

## 2023-07-27 PROBLEM — Z78.9 OTHER SPECIFIED HEALTH STATUS: Chronic | Status: ACTIVE | Noted: 2023-06-26

## 2023-07-31 ENCOUNTER — OUTPATIENT (OUTPATIENT)
Dept: OUTPATIENT SERVICES | Facility: HOSPITAL | Age: 63
LOS: 1 days | End: 2023-07-31
Payer: MEDICARE

## 2023-07-31 VITALS
TEMPERATURE: 98 F | OXYGEN SATURATION: 97 % | SYSTOLIC BLOOD PRESSURE: 132 MMHG | RESPIRATION RATE: 16 BRPM | DIASTOLIC BLOOD PRESSURE: 78 MMHG | HEIGHT: 69 IN | HEART RATE: 80 BPM | WEIGHT: 227.96 LBS

## 2023-07-31 DIAGNOSIS — C67.2 MALIGNANT NEOPLASM OF LATERAL WALL OF BLADDER: ICD-10-CM

## 2023-07-31 DIAGNOSIS — E11.9 TYPE 2 DIABETES MELLITUS WITHOUT COMPLICATIONS: ICD-10-CM

## 2023-07-31 DIAGNOSIS — T84.010A BROKEN INTERNAL RIGHT HIP PROSTHESIS, INITIAL ENCOUNTER: Chronic | ICD-10-CM

## 2023-07-31 DIAGNOSIS — Z96.641 PRESENCE OF RIGHT ARTIFICIAL HIP JOINT: Chronic | ICD-10-CM

## 2023-07-31 DIAGNOSIS — Z98.890 OTHER SPECIFIED POSTPROCEDURAL STATES: Chronic | ICD-10-CM

## 2023-07-31 DIAGNOSIS — Z96.652 PRESENCE OF LEFT ARTIFICIAL KNEE JOINT: Chronic | ICD-10-CM

## 2023-07-31 DIAGNOSIS — I10 ESSENTIAL (PRIMARY) HYPERTENSION: ICD-10-CM

## 2023-07-31 DIAGNOSIS — Z01.818 ENCOUNTER FOR OTHER PREPROCEDURAL EXAMINATION: ICD-10-CM

## 2023-07-31 LAB
ANION GAP SERPL CALC-SCNC: 18 MMOL/L — HIGH (ref 5–17)
BUN SERPL-MCNC: 15 MG/DL — SIGNIFICANT CHANGE UP (ref 7–23)
CALCIUM SERPL-MCNC: 9.5 MG/DL — SIGNIFICANT CHANGE UP (ref 8.4–10.5)
CHLORIDE SERPL-SCNC: 99 MMOL/L — SIGNIFICANT CHANGE UP (ref 96–108)
CO2 SERPL-SCNC: 19 MMOL/L — LOW (ref 22–31)
CREAT SERPL-MCNC: 1.07 MG/DL — SIGNIFICANT CHANGE UP (ref 0.5–1.3)
EGFR: 79 ML/MIN/1.73M2 — SIGNIFICANT CHANGE UP
GLUCOSE SERPL-MCNC: 266 MG/DL — HIGH (ref 70–99)
HCT VFR BLD CALC: 41.2 % — SIGNIFICANT CHANGE UP (ref 39–50)
HGB BLD-MCNC: 14.2 G/DL — SIGNIFICANT CHANGE UP (ref 13–17)
MCHC RBC-ENTMCNC: 28.9 PG — SIGNIFICANT CHANGE UP (ref 27–34)
MCHC RBC-ENTMCNC: 34.5 GM/DL — SIGNIFICANT CHANGE UP (ref 32–36)
MCV RBC AUTO: 83.9 FL — SIGNIFICANT CHANGE UP (ref 80–100)
NRBC # BLD: 0 /100 WBCS — SIGNIFICANT CHANGE UP (ref 0–0)
PLATELET # BLD AUTO: 179 K/UL — SIGNIFICANT CHANGE UP (ref 150–400)
POTASSIUM SERPL-MCNC: 3.8 MMOL/L — SIGNIFICANT CHANGE UP (ref 3.5–5.3)
POTASSIUM SERPL-SCNC: 3.8 MMOL/L — SIGNIFICANT CHANGE UP (ref 3.5–5.3)
RBC # BLD: 4.91 M/UL — SIGNIFICANT CHANGE UP (ref 4.2–5.8)
RBC # FLD: 14.3 % — SIGNIFICANT CHANGE UP (ref 10.3–14.5)
SODIUM SERPL-SCNC: 136 MMOL/L — SIGNIFICANT CHANGE UP (ref 135–145)
WBC # BLD: 4.2 K/UL — SIGNIFICANT CHANGE UP (ref 3.8–10.5)
WBC # FLD AUTO: 4.2 K/UL — SIGNIFICANT CHANGE UP (ref 3.8–10.5)

## 2023-07-31 PROCEDURE — 85027 COMPLETE CBC AUTOMATED: CPT

## 2023-07-31 PROCEDURE — 80048 BASIC METABOLIC PNL TOTAL CA: CPT

## 2023-07-31 PROCEDURE — 87086 URINE CULTURE/COLONY COUNT: CPT

## 2023-07-31 PROCEDURE — G0463: CPT

## 2023-07-31 PROCEDURE — 83036 HEMOGLOBIN GLYCOSYLATED A1C: CPT

## 2023-07-31 RX ORDER — AMOXICILLIN 250 MG/5ML
1 SUSPENSION, RECONSTITUTED, ORAL (ML) ORAL
Refills: 0 | DISCHARGE

## 2023-07-31 NOTE — H&P PST ADULT - PSYCHIATRIC
negative normal/normal affect/alert and oriented x3/normal behavior alert and oriented x3/normal behavior
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
## 2023-07-31 NOTE — H&P PST ADULT - CARDIOVASCULAR
details… normal/regular rate and rhythm/S1 S2 present/no gallops/no rub/no murmur/peripheral edema/vascular regular rate and rhythm/S1 S2 present/no murmur

## 2023-07-31 NOTE — H&P PST ADULT - MUSCULOSKELETAL
decreased ROM due to pain/strength 5/5 bilateral upper extremities/back exam/extremities exam/decreased strength/abnormal gait details…

## 2023-07-31 NOTE — H&P PST ADULT - NSICDXPASTSURGICALHX_GEN_ALL_CORE_FT
PAST SURGICAL HISTORY:  Broken internal right hip prosthesis s/p removal of R hip prosthesis due to infection in 2014 requiring long term ABx    H/O colonoscopy     H/O total knee replacement, left     History of hip replacement, right     History of revision of total replacement of right hip joint      PAST SURGICAL HISTORY:  Broken internal right hip prosthesis s/p removal of R hip prosthesis due to infection in 2014 requiring long term ABx    H/O colonoscopy     H/O total knee replacement, left     History of biopsy of bladder     History of hip replacement, right     History of revision of total replacement of right hip joint
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
## 2023-07-31 NOTE — H&P PST ADULT - NEUROLOGICAL
details… normal/cranial nerves II-XII intact/sensation intact sensation intact/responds to verbal commands/no spontaneous movement

## 2023-07-31 NOTE — H&P PST ADULT - ASSESSMENT
Activity: Walks in shopping mall, gardening, goes up and down the stairs at [facility]    DASI scale:    Mallampati: 3    Dentition: Denies loose teeth/dentures, saw dentist this  Activity: Walks in shopping mall, does gardening, goes up and down the stairs at home    DASI scale: 5.62    Mallampati: 3    Dentition: Denies loose teeth/dentures, saw dentist this year

## 2023-07-31 NOTE — H&P PST ADULT - NSCAFFEAMTFREQ_GEN_ALL_CORE_SD
Alert-The patient is alert, awake and responds to voice. The patient is oriented to time, place, and person. The triage nurse is able to obtain subjective information. 1-2 cups/cans per day

## 2023-07-31 NOTE — H&P PST ADULT - RESPIRATORY
normal/clear to auscultation bilaterally/no wheezes/no rales/no rhonchi clear to auscultation bilaterally/breath sounds equal/respirations non-labored

## 2023-07-31 NOTE — H&P PST ADULT - GASTROINTESTINAL
details… normal/soft/nontender/nondistended/normal active bowel sounds soft/nontender/nondistended/normal active bowel sounds

## 2023-07-31 NOTE — H&P PST ADULT - PROBLEM SELECTOR PLAN 1
Cystoscopy TUR Bladder Cystoscopy, Transurethral Resection of Bladder Tumor  Pre-op instructions provided - all questions answered  Labs done at PST

## 2023-08-01 LAB
A1C WITH ESTIMATED AVERAGE GLUCOSE RESULT: 8.2 % — HIGH (ref 4–5.6)
ESTIMATED AVERAGE GLUCOSE: 189 MG/DL — HIGH (ref 68–114)

## 2023-08-02 LAB
CULTURE RESULTS: SIGNIFICANT CHANGE UP
SPECIMEN SOURCE: SIGNIFICANT CHANGE UP

## 2023-08-09 ENCOUNTER — TRANSCRIPTION ENCOUNTER (OUTPATIENT)
Age: 63
End: 2023-08-09

## 2023-08-09 ENCOUNTER — OUTPATIENT (OUTPATIENT)
Dept: OUTPATIENT SERVICES | Facility: HOSPITAL | Age: 63
LOS: 1 days | End: 2023-08-09
Payer: MEDICARE

## 2023-08-09 VITALS
RESPIRATION RATE: 15 BRPM | HEART RATE: 67 BPM | SYSTOLIC BLOOD PRESSURE: 172 MMHG | DIASTOLIC BLOOD PRESSURE: 90 MMHG | OXYGEN SATURATION: 98 % | TEMPERATURE: 99 F

## 2023-08-09 VITALS
RESPIRATION RATE: 18 BRPM | HEART RATE: 72 BPM | TEMPERATURE: 98 F | DIASTOLIC BLOOD PRESSURE: 80 MMHG | SYSTOLIC BLOOD PRESSURE: 119 MMHG | OXYGEN SATURATION: 97 %

## 2023-08-09 DIAGNOSIS — Z96.641 PRESENCE OF RIGHT ARTIFICIAL HIP JOINT: Chronic | ICD-10-CM

## 2023-08-09 DIAGNOSIS — Z98.890 OTHER SPECIFIED POSTPROCEDURAL STATES: Chronic | ICD-10-CM

## 2023-08-09 DIAGNOSIS — Z96.652 PRESENCE OF LEFT ARTIFICIAL KNEE JOINT: Chronic | ICD-10-CM

## 2023-08-09 DIAGNOSIS — C67.2 MALIGNANT NEOPLASM OF LATERAL WALL OF BLADDER: ICD-10-CM

## 2023-08-09 DIAGNOSIS — T84.010A BROKEN INTERNAL RIGHT HIP PROSTHESIS, INITIAL ENCOUNTER: Chronic | ICD-10-CM

## 2023-08-09 LAB
GLUCOSE BLDC GLUCOMTR-MCNC: 175 MG/DL — HIGH (ref 70–99)
GLUCOSE BLDC GLUCOMTR-MCNC: 199 MG/DL — HIGH (ref 70–99)

## 2023-08-09 PROCEDURE — 52234 CYSTOSCOPY AND TREATMENT: CPT

## 2023-08-09 PROCEDURE — 88305 TISSUE EXAM BY PATHOLOGIST: CPT

## 2023-08-09 PROCEDURE — 88305 TISSUE EXAM BY PATHOLOGIST: CPT | Mod: 26

## 2023-08-09 PROCEDURE — 82962 GLUCOSE BLOOD TEST: CPT

## 2023-08-09 PROCEDURE — C9399: CPT

## 2023-08-09 RX ORDER — SODIUM CHLORIDE 9 MG/ML
3 INJECTION INTRAMUSCULAR; INTRAVENOUS; SUBCUTANEOUS EVERY 8 HOURS
Refills: 0 | Status: DISCONTINUED | OUTPATIENT
Start: 2023-08-09 | End: 2023-08-09

## 2023-08-09 RX ORDER — OXYCODONE AND ACETAMINOPHEN 5; 325 MG/1; MG/1
1 TABLET ORAL ONCE
Refills: 0 | Status: DISCONTINUED | OUTPATIENT
Start: 2023-08-09 | End: 2023-08-09

## 2023-08-09 RX ORDER — METFORMIN HYDROCHLORIDE 850 MG/1
1 TABLET ORAL
Qty: 0 | Refills: 0 | DISCHARGE

## 2023-08-09 RX ORDER — LIDOCAINE HCL 20 MG/ML
0.2 VIAL (ML) INJECTION ONCE
Refills: 0 | Status: DISCONTINUED | OUTPATIENT
Start: 2023-08-09 | End: 2023-08-09

## 2023-08-09 RX ORDER — ONDANSETRON 8 MG/1
4 TABLET, FILM COATED ORAL ONCE
Refills: 0 | Status: DISCONTINUED | OUTPATIENT
Start: 2023-08-09 | End: 2023-08-09

## 2023-08-09 RX ORDER — LOSARTAN POTASSIUM 100 MG/1
1 TABLET, FILM COATED ORAL
Qty: 0 | Refills: 0 | DISCHARGE

## 2023-08-09 RX ORDER — HYDROMORPHONE HYDROCHLORIDE 2 MG/ML
0.5 INJECTION INTRAMUSCULAR; INTRAVENOUS; SUBCUTANEOUS
Refills: 0 | Status: DISCONTINUED | OUTPATIENT
Start: 2023-08-09 | End: 2023-08-09

## 2023-08-09 RX ORDER — CEFAZOLIN SODIUM 1 G
2000 VIAL (EA) INJECTION ONCE
Refills: 0 | Status: COMPLETED | OUTPATIENT
Start: 2023-08-09 | End: 2023-08-09

## 2023-08-09 NOTE — ASU PREOP CHECKLIST - SIDE RAILS UP
CC: SOB, dyspnea,     HPI:51 year old male with a history of stage 4 lung cancer diagnosed 8/2022 that is admitted to [facility] for Palliative radiation. Patient also with right sided neck mass with compression if trachea. Patient c/o SOB, dyspnea,. Not able to tolerate oral diet. Choking on liquids and solids.  Admits to 30lb weight loss over several months.       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)      Lung cancer      Injury due to foreign body  right wrist        Allergies    No Known Allergies    Intolerances      MEDICATIONS  (STANDING):  albuterol/ipratropium for Nebulization 3 milliLiter(s) Nebulizer every 6 hours  folic acid 1 milliGRAM(s) Oral daily  gabapentin 300 milliGRAM(s) Oral every 8 hours  influenza   Vaccine 0.5 milliLiter(s) IntraMuscular once  levothyroxine 50 MICROGram(s) Oral daily  melatonin 3 milliGRAM(s) Oral at bedtime  methylPREDNISolone sodium succinate Injectable 40 milliGRAM(s) IV Push every 8 hours  multivitamin/minerals/iron Oral Solution (CENTRUM) 15 milliLiter(s) Oral daily  nicotine - 21 mG/24Hr(s) Patch 1 Patch Transdermal daily  piperacillin/tazobactam IVPB. 3.375 Gram(s) IV Intermittent once  piperacillin/tazobactam IVPB.- 3.375 Gram(s) IV Intermittent once  sodium chloride 0.9%. 1000 milliLiter(s) (60 mL/Hr) IV Continuous <Continuous>    MEDICATIONS  (PRN):  acetaminophen     Tablet .. 650 milliGRAM(s) Oral every 6 hours PRN Temp greater or equal to 38C (100.4F), Mild Pain (1 - 3)  albuterol    90 MICROgram(s) HFA Inhaler 2 Puff(s) Inhalation every 6 hours PRN Bronchospasm  aluminum hydroxide/magnesium hydroxide/simethicone Suspension 30 milliLiter(s) Oral every 4 hours PRN Dyspepsia  bisacodyl 5 milliGRAM(s) Oral every 12 hours PRN Constipation  HYDROmorphone  Injectable 1 milliGRAM(s) IV Push every 3 hours PRN Severe Pain (7 - 10)  HYDROmorphone  Injectable 0.5 milliGRAM(s) IV Push every 3 hours PRN Moderate Pain (4 - 6)  ondansetron Injectable 4 milliGRAM(s) IV Push every 8 hours PRN Nausea and/or Vomiting  zolpidem 5 milliGRAM(s) Oral at bedtime PRN Insomnia      ROS:   ENT: all negative except as noted in HPI   CV: denies palpitations  Pulm: denies SOB, cough, hemoptysis  GI: denies change in apetite, indigestion, n/v  : denies pertinent urinary symptoms, urgency  Neuro: denies numbness/tingling, loss of sensation  Psych: denies anxiety  MS: denies muscle weakness, instability  Heme: denies easy bruising or bleeding  Endo: denies heat/cold intolerance, excessive sweating  Vascular: denies LE edema    Vital Signs Last 24 Hrs  T(C): 36.8 (14 Feb 2023 11:15), Max: 36.9 (13 Feb 2023 21:19)  T(F): 98.2 (14 Feb 2023 11:15), Max: 98.4 (13 Feb 2023 21:19)  HR: 114 (14 Feb 2023 11:15) (100 - 129)  BP: 136/93 (14 Feb 2023 11:15) (136/93 - 169/99)  BP(mean): --  RR: 22 (14 Feb 2023 11:15) (19 - 22)  SpO2: 96% (14 Feb 2023 11:15) (95% - 100%)    Parameters below as of 14 Feb 2023 11:15  Patient On (Oxygen Delivery Method): room air         02-13    134<L>  |  101  |  11  ----------------------------<  166<H>  3.8   |  25  |  0.51    Ca    9.0      13 Feb 2023 05:58  Mg     1.9     02-13         PHYSICAL EXAM:  Gen: NAD  Skin: No rashes, bruises, or lesions  Head: Normocephalic, Atraumatic  Face: no edema, erythema, or fluctuance. Parotid glands soft without mass  Eyes: no scleral injection  Ears: Right - ear canal clear, TM intact without effusion or erythema. No evidence of any fluid drainage. No mastoid tenderness, erythema, or ear bulging            Left - ear canal clear, TM intact without effusion or erythema. No evidence of any fluid drainage. No mastoid tenderness, erythema, or ear bulging  Nose: Nares bilaterally patent, no discharge  Mouth: No Stridor / Drooling / Trismus.  Mucosa moist, tongue/uvula midline, oropharynx clear  Neck: Right neck mass trachea midline  Lymphatic: No lymphadenopathy  Resp: + mild distress + stridor  CV: no peripheral edema/cyanosis  GI: nondistended   Peripheral vascular: no JVD or edema  Neuro: facial nerve intact, no facial droop      Diagnostic Nasal Endoscopy: No polyps, pink, moist sensate    Fiberoptic Indirect laryngoscopy: No base of tongue masses, epiglottis sharp, Bilateral vocal cord paralysis. VC fixed in adducted position with less then 1 mm of space.     IMAGING/ADDITIONAL STUDIES:   IMPRESSION: Large necrotic mass measuring at least 8.1 x 4.5 cm in the retrotracheal region with compression and displacement of the trachea anteriorly and compression of the pharynx and proximal esophagus as well as the LEFT internal jugular vein and internal carotid artery posterior laterally. This appears to obliterate the majority of the LEFT lobe of the thyroid gland and likely represents a thyroid carcinoma. A necrotic 5.2 x 5.2 cm mass extends from the upper pole of the RIGHT lobe of the thyroid gland with erosion through the RIGHT thyroid cartilage and extension into the RIGHT paralaryngeal space, also suspicious for thyroid carcinoma. A necrotic 2.6 x 1.8 cm LEFT level 2 lymph node is noted. And a necrotic 1 cm LEFT level 3 lymph node is noted.    Critical value: I discussed the finding of this report with Dr. Gallegos at 11:30 AM on 02/10/2023. Critical value policy of the hospital was followed. Read back and confirmation of receipt of this communication was performed. This verbal communication supplements the text report of this document.     done

## 2023-08-09 NOTE — ASU DISCHARGE PLAN (ADULT/PEDIATRIC) - CARE PROVIDER_API CALL
Juan Degroot  Urology  [address] [place] [address]  Phone: [phone]  Fax: [phone]  Established Patient  Scheduled Appointment: 08/11/2023 09:45 AM

## 2023-08-09 NOTE — ASU DISCHARGE PLAN (ADULT/PEDIATRIC) - ASU DC SPECIAL INSTRUCTIONSFT
Patient to be discharged with tellez catheter. Empty tellez bag as needed. Tellez to be removed at follow up appointment

## 2023-08-09 NOTE — ASU DISCHARGE PLAN (ADULT/PEDIATRIC) - PROVIDER TOKENS
PROVIDER:[TOKEN:[[id number]],SCHEDULEDAPPT:[08/11/2023],SCHEDULEDAPPTTIME:[09:45 AM],ESTABLISHEDPATIENT:[T]]

## 2023-08-09 NOTE — ASU DISCHARGE PLAN (ADULT/PEDIATRIC) - CALL YOUR DOCTOR IF YOU HAVE ANY OF THE FOLLOWING:
if catheter is not draining/Fever greater than (need to indicate Fahrenheit or Celsius)/Nausea and vomiting that does not stop

## 2023-08-09 NOTE — ASU PATIENT PROFILE, ADULT - NSTOBACCO TYPE_GEN_A_CORE_RD
Complex Repair And A-T Advancement Flap Text: The defect edges were debeveled with a #15 scalpel blade.  The primary defect was closed partially with a complex linear closure.  Given the location of the remaining defect, shape of the defect and the proximity to free margins an A-T advancement flap was deemed most appropriate for complete closure of the defect.  Using a sterile surgical marker, an appropriate advancement flap was drawn incorporating the defect and placing the expected incisions within the relaxed skin tension lines where possible.    The area thus outlined was incised deep to adipose tissue with a #15 scalpel blade.  The skin margins were undermined to an appropriate distance in all directions utilizing iris scissors. Cigarettes

## 2023-08-15 LAB — SURGICAL PATHOLOGY STUDY: SIGNIFICANT CHANGE UP

## 2023-10-16 ENCOUNTER — EMERGENCY (EMERGENCY)
Facility: HOSPITAL | Age: 63
LOS: 0 days | Discharge: HOME | End: 2023-10-16
Attending: STUDENT IN AN ORGANIZED HEALTH CARE EDUCATION/TRAINING PROGRAM
Payer: MEDICARE

## 2023-10-16 VITALS
OXYGEN SATURATION: 98 % | TEMPERATURE: 98 F | WEIGHT: 225.97 LBS | HEIGHT: 69 IN | SYSTOLIC BLOOD PRESSURE: 147 MMHG | HEART RATE: 84 BPM | RESPIRATION RATE: 16 BRPM | DIASTOLIC BLOOD PRESSURE: 88 MMHG

## 2023-10-16 VITALS
OXYGEN SATURATION: 98 % | TEMPERATURE: 98 F | SYSTOLIC BLOOD PRESSURE: 167 MMHG | HEART RATE: 63 BPM | DIASTOLIC BLOOD PRESSURE: 78 MMHG | RESPIRATION RATE: 17 BRPM

## 2023-10-16 DIAGNOSIS — R20.2 PARESTHESIA OF SKIN: ICD-10-CM

## 2023-10-16 DIAGNOSIS — M25.512 PAIN IN LEFT SHOULDER: ICD-10-CM

## 2023-10-16 DIAGNOSIS — Z79.84 LONG TERM (CURRENT) USE OF ORAL HYPOGLYCEMIC DRUGS: ICD-10-CM

## 2023-10-16 DIAGNOSIS — Z96.652 PRESENCE OF LEFT ARTIFICIAL KNEE JOINT: Chronic | ICD-10-CM

## 2023-10-16 DIAGNOSIS — Z87.39 PERSONAL HISTORY OF OTHER DISEASES OF THE MUSCULOSKELETAL SYSTEM AND CONNECTIVE TISSUE: ICD-10-CM

## 2023-10-16 DIAGNOSIS — R51.9 HEADACHE, UNSPECIFIED: ICD-10-CM

## 2023-10-16 DIAGNOSIS — Z98.890 OTHER SPECIFIED POSTPROCEDURAL STATES: Chronic | ICD-10-CM

## 2023-10-16 DIAGNOSIS — E11.51 TYPE 2 DIABETES MELLITUS WITH DIABETIC PERIPHERAL ANGIOPATHY WITHOUT GANGRENE: ICD-10-CM

## 2023-10-16 DIAGNOSIS — Z86.16 PERSONAL HISTORY OF COVID-19: ICD-10-CM

## 2023-10-16 DIAGNOSIS — Z98.890 OTHER SPECIFIED POSTPROCEDURAL STATES: ICD-10-CM

## 2023-10-16 DIAGNOSIS — Z85.51 PERSONAL HISTORY OF MALIGNANT NEOPLASM OF BLADDER: ICD-10-CM

## 2023-10-16 DIAGNOSIS — Z96.641 PRESENCE OF RIGHT ARTIFICIAL HIP JOINT: Chronic | ICD-10-CM

## 2023-10-16 DIAGNOSIS — E78.5 HYPERLIPIDEMIA, UNSPECIFIED: ICD-10-CM

## 2023-10-16 DIAGNOSIS — T84.010A BROKEN INTERNAL RIGHT HIP PROSTHESIS, INITIAL ENCOUNTER: Chronic | ICD-10-CM

## 2023-10-16 DIAGNOSIS — R11.0 NAUSEA: ICD-10-CM

## 2023-10-16 DIAGNOSIS — I10 ESSENTIAL (PRIMARY) HYPERTENSION: ICD-10-CM

## 2023-10-16 DIAGNOSIS — Z96.641 PRESENCE OF RIGHT ARTIFICIAL HIP JOINT: ICD-10-CM

## 2023-10-16 DIAGNOSIS — Z87.828 PERSONAL HISTORY OF OTHER (HEALED) PHYSICAL INJURY AND TRAUMA: ICD-10-CM

## 2023-10-16 DIAGNOSIS — Z96.652 PRESENCE OF LEFT ARTIFICIAL KNEE JOINT: ICD-10-CM

## 2023-10-16 DIAGNOSIS — K21.9 GASTRO-ESOPHAGEAL REFLUX DISEASE WITHOUT ESOPHAGITIS: ICD-10-CM

## 2023-10-16 DIAGNOSIS — G44.89 OTHER HEADACHE SYNDROME: ICD-10-CM

## 2023-10-16 DIAGNOSIS — G89.29 OTHER CHRONIC PAIN: ICD-10-CM

## 2023-10-16 PROBLEM — U07.1 COVID-19: Chronic | Status: ACTIVE | Noted: 2023-07-31

## 2023-10-16 PROBLEM — D49.4 NEOPLASM OF UNSPECIFIED BEHAVIOR OF BLADDER: Chronic | Status: ACTIVE | Noted: 2023-07-31

## 2023-10-16 LAB
ALBUMIN SERPL ELPH-MCNC: 3.9 G/DL — SIGNIFICANT CHANGE UP (ref 3.3–5)
ALP SERPL-CCNC: 113 U/L — SIGNIFICANT CHANGE UP (ref 40–120)
ALT FLD-CCNC: 37 U/L — SIGNIFICANT CHANGE UP (ref 12–78)
ANION GAP SERPL CALC-SCNC: 5 MMOL/L — SIGNIFICANT CHANGE UP (ref 5–17)
AST SERPL-CCNC: 22 U/L — SIGNIFICANT CHANGE UP (ref 15–37)
BASOPHILS # BLD AUTO: 0.02 K/UL — SIGNIFICANT CHANGE UP (ref 0–0.2)
BASOPHILS NFR BLD AUTO: 0.4 % — SIGNIFICANT CHANGE UP (ref 0–2)
BILIRUB SERPL-MCNC: 1 MG/DL — SIGNIFICANT CHANGE UP (ref 0.2–1.2)
BUN SERPL-MCNC: 11 MG/DL — SIGNIFICANT CHANGE UP (ref 7–23)
CALCIUM SERPL-MCNC: 8.7 MG/DL — SIGNIFICANT CHANGE UP (ref 8.5–10.1)
CHLORIDE SERPL-SCNC: 103 MMOL/L — SIGNIFICANT CHANGE UP (ref 96–108)
CO2 SERPL-SCNC: 29 MMOL/L — SIGNIFICANT CHANGE UP (ref 22–31)
CREAT SERPL-MCNC: 1.03 MG/DL — SIGNIFICANT CHANGE UP (ref 0.5–1.3)
EGFR: 82 ML/MIN/1.73M2 — SIGNIFICANT CHANGE UP
EOSINOPHIL # BLD AUTO: 0.09 K/UL — SIGNIFICANT CHANGE UP (ref 0–0.5)
EOSINOPHIL NFR BLD AUTO: 1.8 % — SIGNIFICANT CHANGE UP (ref 0–6)
GLUCOSE SERPL-MCNC: 141 MG/DL — HIGH (ref 70–99)
HCT VFR BLD CALC: 43.3 % — SIGNIFICANT CHANGE UP (ref 39–50)
HGB BLD-MCNC: 15.2 G/DL — SIGNIFICANT CHANGE UP (ref 13–17)
IMM GRANULOCYTES NFR BLD AUTO: 0.8 % — SIGNIFICANT CHANGE UP (ref 0–0.9)
LYMPHOCYTES # BLD AUTO: 1.05 K/UL — SIGNIFICANT CHANGE UP (ref 1–3.3)
LYMPHOCYTES # BLD AUTO: 21.3 % — SIGNIFICANT CHANGE UP (ref 13–44)
MCHC RBC-ENTMCNC: 28.9 PG — SIGNIFICANT CHANGE UP (ref 27–34)
MCHC RBC-ENTMCNC: 35.1 G/DL — SIGNIFICANT CHANGE UP (ref 32–36)
MCV RBC AUTO: 82.3 FL — SIGNIFICANT CHANGE UP (ref 80–100)
MONOCYTES # BLD AUTO: 0.3 K/UL — SIGNIFICANT CHANGE UP (ref 0–0.9)
MONOCYTES NFR BLD AUTO: 6.1 % — SIGNIFICANT CHANGE UP (ref 2–14)
NEUTROPHILS # BLD AUTO: 3.42 K/UL — SIGNIFICANT CHANGE UP (ref 1.8–7.4)
NEUTROPHILS NFR BLD AUTO: 69.6 % — SIGNIFICANT CHANGE UP (ref 43–77)
NRBC # BLD: 0 /100 WBCS — SIGNIFICANT CHANGE UP (ref 0–0)
PLATELET # BLD AUTO: 161 K/UL — SIGNIFICANT CHANGE UP (ref 150–400)
POTASSIUM SERPL-MCNC: 4.1 MMOL/L — SIGNIFICANT CHANGE UP (ref 3.5–5.3)
POTASSIUM SERPL-SCNC: 4.1 MMOL/L — SIGNIFICANT CHANGE UP (ref 3.5–5.3)
PROT SERPL-MCNC: 7.6 GM/DL — SIGNIFICANT CHANGE UP (ref 6–8.3)
RBC # BLD: 5.26 M/UL — SIGNIFICANT CHANGE UP (ref 4.2–5.8)
RBC # FLD: 14.2 % — SIGNIFICANT CHANGE UP (ref 10.3–14.5)
SODIUM SERPL-SCNC: 137 MMOL/L — SIGNIFICANT CHANGE UP (ref 135–145)
WBC # BLD: 4.92 K/UL — SIGNIFICANT CHANGE UP (ref 3.8–10.5)
WBC # FLD AUTO: 4.92 K/UL — SIGNIFICANT CHANGE UP (ref 3.8–10.5)

## 2023-10-16 PROCEDURE — 99284 EMERGENCY DEPT VISIT MOD MDM: CPT

## 2023-10-16 PROCEDURE — 70450 CT HEAD/BRAIN W/O DYE: CPT | Mod: 26,MA

## 2023-10-16 RX ORDER — SODIUM CHLORIDE 9 MG/ML
1000 INJECTION INTRAMUSCULAR; INTRAVENOUS; SUBCUTANEOUS ONCE
Refills: 0 | Status: COMPLETED | OUTPATIENT
Start: 2023-10-16 | End: 2023-10-16

## 2023-10-16 RX ORDER — METOCLOPRAMIDE HCL 10 MG
10 TABLET ORAL ONCE
Refills: 0 | Status: COMPLETED | OUTPATIENT
Start: 2023-10-16 | End: 2023-10-16

## 2023-10-16 RX ORDER — ACETAMINOPHEN 500 MG
1000 TABLET ORAL ONCE
Refills: 0 | Status: COMPLETED | OUTPATIENT
Start: 2023-10-16 | End: 2023-10-16

## 2023-10-16 RX ADMIN — Medication 400 MILLIGRAM(S): at 15:21

## 2023-10-16 RX ADMIN — Medication 10 MILLIGRAM(S): at 15:21

## 2023-10-16 RX ADMIN — SODIUM CHLORIDE 1000 MILLILITER(S): 9 INJECTION INTRAMUSCULAR; INTRAVENOUS; SUBCUTANEOUS at 15:21

## 2023-10-16 RX ADMIN — Medication 1000 MILLIGRAM(S): at 15:51

## 2023-10-16 NOTE — ED ADULT NURSE NOTE - HOW PATIENT ADDRESSED, PROFILE
"Encounter Date: 3/12/2018    SCRIBE #1 NOTE: I, Valentina Maddox, am scribing for, and in the presence of,  Dr. Espinoza. I have scribed the entire note.       History     Chief Complaint   Patient presents with    Morning Sickness     8 weeks pregnant     03/12/2018 3:36 PM     Chief complaint: Emesis      Telma Whitlock is a 30 y.o. female who presents to the ED with an onset of worsening, persistent emesis that began 5 days ago with associated nausea, mild abdominal pain, decreased PO intake, and decreased urine output. When her symptoms began, the patient went to the hospital and was prescribed Zofran with no relief. The patient is 8 weeks pregnant. This is her third pregnancy (G: 3; P: 2; Ab: 0). The patient did not have any complications with her first two pregnancies and did not experience morning sickness that was this severe before. She has an appointment to see her OB/GYN "on March 21st." Patient denies having diarrhea or constipation. She reports throwing up "more than 10 times a day." NKDA. She is not a smoker and denies drug or alcohol use. There are no alleviating factors for her symptoms. No other PMHx noted. No PSHx noted.      The history is provided by the patient.     Review of patient's allergies indicates:  No Known Allergies  History reviewed. No pertinent past medical history.  History reviewed. No pertinent surgical history.  History reviewed. No pertinent family history.  Social History   Substance Use Topics    Smoking status: Never Smoker    Smokeless tobacco: Not on file    Alcohol use Not on file     Review of Systems   Constitutional: Negative for fever.        Positive for decreased PO intake.   HENT: Negative for sore throat.    Respiratory: Negative for shortness of breath.    Cardiovascular: Negative for chest pain.   Gastrointestinal: Positive for abdominal pain (mild), nausea and vomiting. Negative for constipation and diarrhea.   Genitourinary: Positive for decreased urine volume. " Negative for dysuria.        Positive for being 8 weeks pregnant.   Musculoskeletal: Negative for back pain.   Skin: Negative for rash.   Neurological: Negative for weakness.   Hematological: Does not bruise/bleed easily.       Physical Exam     Initial Vitals [03/12/18 1408]   BP Pulse Resp Temp SpO2   111/63 (!) 122 14 98.6 °F (37 °C) 98 %      MAP       79         Physical Exam    Nursing note and vitals reviewed.  Constitutional: She appears well-developed and well-nourished. She is not diaphoretic. No distress.   Pt appears weak.   HENT:   Head: Normocephalic and atraumatic.   Mouth/Throat: Mucous membranes are dry.   Eyes: EOM are normal. Pupils are equal, round, and reactive to light.   Neck: Normal range of motion. Neck supple.   Cardiovascular: Regular rhythm, normal heart sounds and intact distal pulses. Tachycardia present.    No murmur heard.  Pulmonary/Chest: Breath sounds normal. No respiratory distress. She has no wheezes. She has no rhonchi. She has no rales.   Abdominal: Soft. She exhibits no distension. There is no tenderness. There is no rebound, no guarding and no CVA tenderness.   Genitourinary:   Genitourinary Comments: No CVA tenderness.   Musculoskeletal: Normal range of motion. She exhibits no edema or tenderness.   Neurological: She is alert and oriented to person, place, and time. She has normal strength.   Skin: Skin is warm and dry. No rash noted.         ED Course   Procedures  Labs Reviewed   CBC W/ AUTO DIFFERENTIAL - Abnormal; Notable for the following:        Result Value    WBC 12.90 (*)     RBC 5.67 (*)     MCV 79 (*)     MCH 25.9 (*)     RDW 16.4 (*)     Platelets 458 (*)     MPV 7.3 (*)     Gran # (ANC) 10.6 (*)     Gran% 81.9 (*)     Lymph% 13.4 (*)     All other components within normal limits   COMPREHENSIVE METABOLIC PANEL - Abnormal; Notable for the following:     CO2 21 (*)     BUN, Bld 21 (*)     Total Protein 9.6 (*)     Total Bilirubin 2.2 (*)     AST 49 (*)     Anion  Gap 19 (*)     All other components within normal limits   URINALYSIS - Abnormal; Notable for the following:     Color, UA Brown (*)     Specific Gravity, UA >=1.030 (*)     Protein, UA 2+ (*)     Glucose, UA 1+ (*)     Ketones, UA 3+ (*)     Bilirubin (UA) 2+ (*)     Occult Blood UA 1+ (*)     Urobilinogen, UA 4.0-6.0 (*)     All other components within normal limits   URINALYSIS MICROSCOPIC - Abnormal; Notable for the following:     RBC, UA 5 (*)     Hyaline Casts, UA 5 (*)     All other components within normal limits   POCT URINE PREGNANCY - Abnormal; Notable for the following:     POC Preg Test, Ur Positive (*)     All other components within normal limits   HCG, QUANTITATIVE, PREGNANCY             Medical Decision Making:   History:   Old Medical Records: I decided to obtain old medical records.  Initial Assessment:   This is an emergent evaluation.  Differential Diagnosis:   I believe that the pt is experiencing hyperemesis gravidarum. I am concerned for electrolyte derangement, dehydration, acute renal failure, and UTI.   Clinical Tests:   Lab Tests: Reviewed and Ordered  ED Management:  I will provide copious amounts of IV fluids and IV Zofran. No obvious  complaints. I do not feel strongly that an ultrasound is warranted. I will check labs, UA, and will reassess.     17:41: Patient continues to feel terrible and is actively nauseous and vomiting despite Zofran and IV fluids. Bilirubin is minimally elevated at 2.2. Repeat abdominal exam is benign. I will give her a dose of Reglan. If this is unsuccessful in resolving her symptoms, I will transfer the patient for hyperemesis gravidarum.     19:38: Patient feels better after Reglan. She has tolerated liquids and feels that she is ready to be discharged. Prescription will be written. She has been instructed to follow up with OB/GYN closely. At this time, I feel that she is clinically stable for discharge.            Scribe Attestation:   Scribe #1: I  performed the above scribed service and the documentation accurately describes the services I performed. I attest to the accuracy of the note.    I, Dr. Warren Espinoza, personally performed the services described in this documentation. All medical record entries made by the scribe were at my direction and in my presence.  I have reviewed the chart and agree that the record reflects my personal performance and is accurate and complete. Warren Espinoza MD.  9:23 PM 03/12/2018             Clinical Impression:     1. Hyperemesis gravidarum    2. Hyperbilirubinemia          Disposition:   Disposition: Discharged  Condition: Stable                        Warren Espinoza MD  03/12/18 8718     [place]

## 2023-10-16 NOTE — ED PROVIDER NOTE - NS ED ROS FT
Gen: No fever, normal appetite  Eyes: No vision changes   ENT: No ear pain, congestion, sore throat  Resp: No cough or trouble breathing  Cardiovascular: No chest pain or palpitation  Gastroenteric: No vomiting or abd pain   MS: chronic joint pains   Skin: No rashes  Neuro: +headache; no abnormal movements  Remainder negative, except as per the HPI

## 2023-10-16 NOTE — ED ADULT NURSE NOTE - NSFALLUNIVINTERV_ED_ALL_ED
Bed/Stretcher in lowest position, wheels locked, appropriate side rails in place/Call bell, personal items and telephone in reach/Instruct patient to call for assistance before getting out of bed/chair/stretcher/Non-slip footwear applied when patient is off stretcher/[place] to call system/Physically safe environment - no spills, clutter or unnecessary equipment/Purposeful proactive rounding/Room/bathroom lighting operational, light cord in reach
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
## 2023-10-16 NOTE — ED PROVIDER NOTE - PHYSICAL EXAMINATION
Gen: AOX3, NAD  Head: NCAT  ENT: Airway patent, moist mucous membranes, nasal passageways clear, EOMI   Cardiac: Normal rate, normal rhythm, no murmurs   Respiratory: Lungs CTA B/L  Gastrointestinal: Abdomen nondistended, central adiposity present   MSK: No gross abnormalities, FROM of all four extremities, no edema  HEME: Extremities warm and well perfused   Skin: healed surgical scar left knee, no rash/ lesions   Neuro: No gross neurologic deficits, CN II-XII intact, no facial asymmetry, no dysarthria, no dysmetria, no drift, strength equal in all four extremities, no gait abnormality, no sensory deficits, no nuchal rigidity

## 2023-10-16 NOTE — ED PROVIDER NOTE - PROGRESS NOTE DETAILS
Huynh DO: pt reassessed, feels much better, copy of lab results provided, outpt follow up and return precautions discussed

## 2023-10-16 NOTE — ED PROVIDER NOTE - NSFOLLOWUPINSTRUCTIONS_ED_ALL_ED_FT
You were seen today for headache - you had ct brain and labs without significant abnormalities. Your sugar was slightly elevated. You were given tylenol, reglan and fluids for symptom relief.     For continued symptoms - you can take     Take ibuprofen 600 mg every 8 hours as needed for pain with food and plenty of water for up to 5 days  Take tylenol 650 mg every 6 hours as needed for pain     Follow up with primary care physician and neurology for recurrent headaches      Headache    A headache is pain or discomfort felt around the head or neck area. The specific cause of a headache may not be found as there are many types including tension headaches, migraine headaches, and cluster headaches. Watch your condition for any changes. Things you can do to manage your pain include taking over the counter and prescription medications as instructed by your health care provider, lying down in a dark quiet room, limiting stress, getting regular sleep, and refraining from alcohol and tobacco products.    SEEK IMMEDIATE MEDICAL CARE IF YOU HAVE ANY OF THE FOLLOWING SYMPTOMS: fever, vomiting, stiff neck, loss of vision, problems with speech, muscle weakness, loss of balance, trouble walking, passing out, or confusion.

## 2023-10-16 NOTE — ED PROVIDER NOTE - OBJECTIVE STATEMENT
63M pmhx htn, dm, multiple orthopedic surgeries including knee and hip replacement who presents with pressure like diffuse headache duration x  48 hours, no clear provoking/ alleviating factors, with associated nausea without vomiting. Notes chronic pain left shoulder from possible rotator cuff injury. Denies fevers, numbness, weakness, chest pain or sob. Patient reports occasional tingling both fingertips. Pt states saw pmd who referred pt for ct head. Pt reports similar headaches previously due to dehydration. States headache makes him feel light headed. States did not take anything for pain today

## 2023-10-16 NOTE — ED PROVIDER NOTE - NSICDXPASTSURGICALHX_GEN_ALL_CORE_FT
PAST SURGICAL HISTORY:  Broken internal right hip prosthesis s/p removal of R hip prosthesis due to infection in 2014 requiring long term ABx    H/O colonoscopy     H/O total knee replacement, left     History of biopsy of bladder     History of hip replacement, right     History of revision of total replacement of right hip joint

## 2023-10-16 NOTE — ED PROVIDER NOTE - PATIENT PORTAL LINK FT
You can access the FollowMyHealth Patient Portal offered by [facility] by registering at the following website: [email or web address]. By joining [facility]’s FollowMyHealth portal, you will also be able to view your health information using other applications (apps) compatible with our system.

## 2023-10-16 NOTE — ED ADULT NURSE NOTE - CHIEF COMPLAINT QUOTE
Patient reports " I get migraines when I get dehydrated. " Headache started yesterday. + photophobia. + nausea. PMH: Hip replacement R, L knee replacement, Bladder cancer. Denies injury or Trauma. Sent by PMD for CT scan.

## 2023-10-16 NOTE — ED ADULT NURSE NOTE - OBJECTIVE STATEMENT
63 year old male hx right knee replacement left hip replacement and bladder cancer c/o headache nausea and light sensitivity that started yesterday. Denies Chest pain SOB or difficulty breathing.

## 2023-10-16 NOTE — ED PROVIDER NOTE - NSFOLLOWUPCLINICS_GEN_ALL_ED_FT
[facility]  Neurology  [address] [place] [address] [address] [place] [address]  Phone: [phone]  Fax:

## 2023-10-16 NOTE — ED PROVIDER NOTE - CLINICAL SUMMARY MEDICAL DECISION MAKING FREE TEXT BOX
63M pmhx htn, dm, multiple orthopedic surgeries including knee and hip replacement who presents with pressure like diffuse headache duration x  48 hours, no clear provoking/ alleviating factors, with associated nausea without vomiting. Notes chronic pain left shoulder from possible rotator cuff injury. Denies fevers, numbness, weakness, chest pain or sob. Patient reports occasional tingling both fingertips . Pt states saw pmd who referred pt for ct head . Pt reports similar headaches previously due to dehydration. States headache makes him feel light headed. States did not take anything for pain today   -well appearing, no nuchal rigidity, symptom duration and quality not suggestive of acute SAH, pt reports similar headaches previously due to dehydration, trial symptomatic relief with acetaminophen, reglan, fluids, check labs for anemia, metabolic derangements, ct brain rule out large intracranial space occupying lesions, hydrocephalus, less likely bleeding, reassess, nonfocal neuro exam

## 2023-10-16 NOTE — ED ADULT NURSE NOTE - ALCOHOL PRE SCREEN (AUDIT - C)
Problem: Activity Intolerance  Goal: # Functional status is maintained or returned to baseline  Outcome: Outcome Met, Continue evaluating goal progress toward completion  Pt. ambulating in hallway with staff.       Statement Selected

## 2023-10-16 NOTE — ED ADULT TRIAGE NOTE - CHIEF COMPLAINT QUOTE
Patient reports " I get migraines when I get dehydrated. " Headache started yesterday. + photophobia. + nausea. PMH: Hip replacement R, L knee replacement, Bladder cancer Patient reports " I get migraines when I get dehydrated. " Headache started yesterday. + photophobia. + nausea. PMH: Hip replacement R, L knee replacement, Bladder cancer. Denies injury or Trauma. Sent by PMD for CT scan.

## 2023-12-09 ENCOUNTER — APPOINTMENT (OUTPATIENT)
Dept: MRI IMAGING | Facility: CLINIC | Age: 63
End: 2023-12-09
Payer: MEDICARE

## 2023-12-09 ENCOUNTER — OUTPATIENT (OUTPATIENT)
Dept: OUTPATIENT SERVICES | Facility: HOSPITAL | Age: 63
LOS: 1 days | End: 2023-12-09
Payer: MEDICARE

## 2023-12-09 DIAGNOSIS — Z96.641 PRESENCE OF RIGHT ARTIFICIAL HIP JOINT: Chronic | ICD-10-CM

## 2023-12-09 DIAGNOSIS — Z98.890 OTHER SPECIFIED POSTPROCEDURAL STATES: Chronic | ICD-10-CM

## 2023-12-09 DIAGNOSIS — Z96.652 PRESENCE OF LEFT ARTIFICIAL KNEE JOINT: Chronic | ICD-10-CM

## 2023-12-09 DIAGNOSIS — Z00.8 ENCOUNTER FOR OTHER GENERAL EXAMINATION: ICD-10-CM

## 2023-12-09 PROCEDURE — 70553 MRI BRAIN STEM W/O & W/DYE: CPT | Mod: 26

## 2023-12-09 PROCEDURE — 70553 MRI BRAIN STEM W/O & W/DYE: CPT

## 2023-12-09 PROCEDURE — A9585: CPT

## 2024-06-27 NOTE — H&P PST ADULT - PROBLEM SELECTOR PLAN 2
Left Voicemail (2nd  Attempt) for the patient to call back and schedule the following:    Appointment type: return   Provider: Dr. Ponce   Return date: 9/3  Specialty phone number: [phone]  Additional appointment(s) needed:   Additional Notes:       Metformin last 7/11 AM  FS on arrival Continue Lopressor as indicated, including am of surgery with sip of water

## 2024-12-26 ENCOUNTER — APPOINTMENT (OUTPATIENT)
Dept: CARDIOLOGY | Facility: CLINIC | Age: 64
End: 2024-12-26
Payer: MEDICARE

## 2024-12-26 ENCOUNTER — NON-APPOINTMENT (OUTPATIENT)
Age: 64
End: 2024-12-26

## 2024-12-26 VITALS
BODY MASS INDEX: 34.21 KG/M2 | OXYGEN SATURATION: 97 % | HEART RATE: 87 BPM | SYSTOLIC BLOOD PRESSURE: 132 MMHG | HEIGHT: 69 IN | WEIGHT: 231 LBS | DIASTOLIC BLOOD PRESSURE: 68 MMHG

## 2024-12-26 DIAGNOSIS — E11.9 TYPE 2 DIABETES MELLITUS W/OUT COMPLICATIONS: ICD-10-CM

## 2024-12-26 DIAGNOSIS — E78.5 HYPERLIPIDEMIA, UNSPECIFIED: ICD-10-CM

## 2024-12-26 DIAGNOSIS — I10 ESSENTIAL (PRIMARY) HYPERTENSION: ICD-10-CM

## 2024-12-26 PROCEDURE — 93000 ELECTROCARDIOGRAM COMPLETE: CPT

## 2024-12-26 PROCEDURE — 99204 OFFICE O/P NEW MOD 45 MIN: CPT | Mod: 25

## 2024-12-26 RX ORDER — ATORVASTATIN CALCIUM 20 MG/1
20 TABLET, FILM COATED ORAL DAILY
Refills: 0 | Status: ACTIVE | COMMUNITY

## 2024-12-26 RX ORDER — INSULIN LISPRO-AABC 100 [IU]/ML
INJECTION, SOLUTION SUBCUTANEOUS
Refills: 0 | Status: ACTIVE | COMMUNITY

## 2024-12-26 RX ORDER — GLIPIZIDE 5 MG/1
5 TABLET ORAL TWICE DAILY
Refills: 0 | Status: ACTIVE | COMMUNITY

## 2024-12-26 RX ORDER — LINAGLIPTIN 5 MG/1
5 TABLET, FILM COATED ORAL DAILY
Refills: 0 | Status: ACTIVE | COMMUNITY

## 2025-01-20 ENCOUNTER — APPOINTMENT (OUTPATIENT)
Dept: CARDIOLOGY | Facility: CLINIC | Age: 65
End: 2025-01-20
Payer: MEDICARE

## 2025-01-20 PROCEDURE — 93306 TTE W/DOPPLER COMPLETE: CPT

## (undated) DEVICE — PREP BETADINE 5% STERILE OPTHALMIC SOLUTION

## (undated) DEVICE — WARMING BLANKET UPPER ADULT

## (undated) DEVICE — FOLEY CATH 3-WAY 22FR 30CC LATEX LUBRICATH

## (undated) DEVICE — ELCTR ROLLER BALL BLK 24-26FR

## (undated) DEVICE — GLV 7.5 PROTEXIS (WHITE)

## (undated) DEVICE — CABLE DAC ACTIVE CORD

## (undated) DEVICE — SOL IRR BAG NS 0.9% 3000ML

## (undated) DEVICE — TUBING RANGER FLUID IRRIGATION SET DISP

## (undated) DEVICE — SYR LUER LOK 30CC

## (undated) DEVICE — ELCTR BUGBEE FULGATING 5FR X 58CM

## (undated) DEVICE — ELCTR VAPORIZT GRV BLK

## (undated) DEVICE — FOLEY CATH 3-WAY 26FR 30CC LATEX LUBRICATH

## (undated) DEVICE — ELCTR BUTTON

## (undated) DEVICE — SOL IRR BAG H2O 3000ML

## (undated) DEVICE — FOLEY CATH 2-WAY 18FR 5CC LATEX COUDE RED

## (undated) DEVICE — FOLEY CATH 2-WAY 20F 5CC LATEX LUBRICATH

## (undated) DEVICE — ELCTR CUTTING LOOP RIGHT ANGLE 24FR

## (undated) DEVICE — ACMI SELF-SEALING SEAL UP TO 7FR

## (undated) DEVICE — ELCTR GROOVED VAPOR

## (undated) DEVICE — FOLEY HOLDER STATLOCK 2 WAY ADULT

## (undated) DEVICE — FOLEY CATH 3-WAY 20FR 5CC LATEX LUBRICATH

## (undated) DEVICE — IRRIGATION TRAY W PISTON SYRINGE 60ML

## (undated) DEVICE — ELCTR PLASMA LOOP MEDIUM ANGLED 24FR 12-30 DEG

## (undated) DEVICE — POSITIONER FOAM EGG CRATE ULNAR 2PCS (PINK)

## (undated) DEVICE — FOLEY CATH 3-WAY 24FR 30CC LATEX LUBRICATH

## (undated) DEVICE — FOLEY CATH 3-WAY 20FR 30CC LATEX LUBRICATH

## (undated) DEVICE — BAG URINE W METER 2L

## (undated) DEVICE — FOLEY CATH 2-WAY 18FR 5CC LATEX HYDROGEL

## (undated) DEVICE — PACK CYSTO

## (undated) DEVICE — FOLEY CATH 2-WAY 24FR 5CC LATEX HYDROGEL

## (undated) DEVICE — TUBING SUCTION 20FT

## (undated) DEVICE — FOLEY CATH 2-WAY 22FR 5CC LATEX COUNCIL RED

## (undated) DEVICE — FOLEY CATH 3-WAY 24FR 5CC LATEX LUBRICATH

## (undated) DEVICE — FOLEY CATH 3-WAY 18FR 30CC LATEX LUBRICATH

## (undated) DEVICE — VENODYNE/SCD SLEEVE CALF LARGE

## (undated) DEVICE — GOWN TRIMAX LG

## (undated) DEVICE — FOLEY CATH 2-WAY 16FR 5CC LATEX LUBRICATH